# Patient Record
Sex: MALE | Race: WHITE | Employment: FULL TIME | URBAN - METROPOLITAN AREA
[De-identification: names, ages, dates, MRNs, and addresses within clinical notes are randomized per-mention and may not be internally consistent; named-entity substitution may affect disease eponyms.]

---

## 2020-08-12 ENCOUNTER — HOSPITAL ENCOUNTER (EMERGENCY)
Facility: HOSPITAL | Age: 65
Discharge: HOME | End: 2020-08-12
Attending: EMERGENCY MEDICINE

## 2020-08-12 ENCOUNTER — APPOINTMENT (OUTPATIENT)
Dept: GENERAL RADIOLOGY | Facility: HOSPITAL | Age: 65
End: 2020-08-12
Attending: EMERGENCY MEDICINE

## 2020-08-12 ENCOUNTER — APPOINTMENT (OUTPATIENT)
Dept: GENERAL RADIOLOGY | Facility: HOSPITAL | Age: 65
End: 2020-08-12

## 2020-08-12 VITALS
RESPIRATION RATE: 18 BRPM | TEMPERATURE: 98 F | WEIGHT: 180 LBS | SYSTOLIC BLOOD PRESSURE: 170 MMHG | BODY MASS INDEX: 23.86 KG/M2 | OXYGEN SATURATION: 98 % | DIASTOLIC BLOOD PRESSURE: 84 MMHG | HEIGHT: 73 IN | HEART RATE: 60 BPM

## 2020-08-12 DIAGNOSIS — S80.212A KNEE ABRASION, LEFT, INITIAL ENCOUNTER: ICD-10-CM

## 2020-08-12 DIAGNOSIS — S63.259A DISLOCATION OF FINGER, INITIAL ENCOUNTER: ICD-10-CM
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
## 2020-08-12 DIAGNOSIS — S00.81XA ABRASION OF FACE, INITIAL ENCOUNTER: ICD-10-CM

## 2020-08-12 PROCEDURE — 99283 EMERGENCY DEPT VISIT LOW MDM: CPT

## 2020-08-12 PROCEDURE — 90471 IMMUNIZATION ADMIN: CPT

## 2020-08-12 PROCEDURE — 99284 EMERGENCY DEPT VISIT MOD MDM: CPT

## 2020-08-12 PROCEDURE — 96372 THER/PROPH/DIAG INJ SC/IM: CPT

## 2020-08-12 PROCEDURE — 26770 TREAT FINGER DISLOCATION: CPT

## 2020-08-12 PROCEDURE — 73130 X-RAY EXAM OF HAND: CPT | Performed by: EMERGENCY MEDICINE

## 2020-08-12 RX ORDER — TETANUS AND DIPHTHERIA TOXOIDS ADSORBED 2; 2 [LF]/.5ML; [LF]/.5ML
0.5 INJECTION INTRAMUSCULAR ONCE
Status: COMPLETED | OUTPATIENT
Start: 2020-08-12 | End: 2020-08-12

## 2020-08-12 RX ORDER — CEFADROXIL 500 MG/1
500 CAPSULE ORAL 2 TIMES DAILY
Qty: 20 CAPSULE | Refills: 0 | Status: SHIPPED | OUTPATIENT
Start: 2020-08-12 | End: 2020-08-22

## 2020-08-12 RX ORDER — DIAZEPAM 5 MG/ML
5 INJECTION, SOLUTION INTRAMUSCULAR; INTRAVENOUS ONCE
Status: COMPLETED | OUTPATIENT
Start: 2020-08-12 | End: 2020-08-12

## 2020-08-12 RX ORDER — TRAMADOL HYDROCHLORIDE 50 MG/1
TABLET ORAL EVERY 6 HOURS PRN
Qty: 10 TABLET | Refills: 0 | Status: SHIPPED | OUTPATIENT
Start: 2020-08-12 | End: 2020-08-19

## 2020-08-12 RX ORDER — NAPROXEN 500 MG/1
500 TABLET ORAL 2 TIMES DAILY PRN
Qty: 20 TABLET | Refills: 0 | Status: SHIPPED | OUTPATIENT
Start: 2020-08-12 | End: 2021-06-21

## 2020-08-12 RX ORDER — TRAMADOL HYDROCHLORIDE 50 MG/1
50 TABLET ORAL ONCE
Status: DISCONTINUED | OUTPATIENT
Start: 2020-08-12 | End: 2020-08-12

## 2020-08-12 RX ORDER — CEPHALEXIN 500 MG/1
500 CAPSULE ORAL ONCE
Status: COMPLETED | OUTPATIENT
Start: 2020-08-12 | End: 2020-08-12
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
## 2021-06-07 ENCOUNTER — OFFICE VISIT (OUTPATIENT)
Dept: FAMILY MEDICINE CLINIC | Facility: CLINIC | Age: 66
End: 2021-06-07
Payer: COMMERCIAL

## 2021-06-07 VITALS
RESPIRATION RATE: 16 BRPM | BODY MASS INDEX: 24.1 KG/M2 | SYSTOLIC BLOOD PRESSURE: 170 MMHG | OXYGEN SATURATION: 98 % | HEIGHT: 73 IN | HEART RATE: 56 BPM | DIASTOLIC BLOOD PRESSURE: 78 MMHG | WEIGHT: 181.81 LBS

## 2021-06-07 DIAGNOSIS — M54.41 CHRONIC RIGHT-SIDED LOW BACK PAIN WITH RIGHT-SIDED SCIATICA: ICD-10-CM

## 2021-06-07 DIAGNOSIS — K64.9 HEMORRHOIDS, UNSPECIFIED HEMORRHOID TYPE: Primary | ICD-10-CM

## 2021-06-07 DIAGNOSIS — Z12.11 COLON CANCER SCREENING: ICD-10-CM

## 2021-06-07 DIAGNOSIS — I10 HYPERTENSION, UNSPECIFIED TYPE: ICD-10-CM

## 2021-06-07 DIAGNOSIS — G89.29 CHRONIC RIGHT-SIDED LOW BACK PAIN WITH RIGHT-SIDED SCIATICA: ICD-10-CM

## 2021-06-07 PROCEDURE — 99204 OFFICE O/P NEW MOD 45 MIN: CPT | Performed by: FAMILY MEDICINE

## 2021-06-07 PROCEDURE — 3078F DIAST BP <80 MM HG: CPT | Performed by: FAMILY MEDICINE

## 2021-06-07 PROCEDURE — 3077F SYST BP >= 140 MM HG: CPT | Performed by: FAMILY MEDICINE

## 2021-06-07 PROCEDURE — 3008F BODY MASS INDEX DOCD: CPT | Performed by: FAMILY MEDICINE

## 2021-06-07 NOTE — PROGRESS NOTES
Patient presents with:  Establish Care: New Patient   Hemorrhoids: Possible Interal Inflares     HPI:   Brenden Dixon is a 72year old male who presents to the office as a new patient to discuss GI issues.       Hemorrhoids - last few months noticing more he (1.854 m)   Wt 181 lb 12.8 oz (82.5 kg)   SpO2 98%   BMI 23.99 kg/m²     General appearance: alert, appears stated age and cooperative  Eyes: conjunctivae/corneas clear. PERRL, EOM's intact.    and thyroid not enlarged, symmetric, no tenderness/mass/nodules

## 2021-06-17 ENCOUNTER — TELEPHONE (OUTPATIENT)
Dept: FAMILY MEDICINE CLINIC | Facility: CLINIC | Age: 66
End: 2021-06-17

## 2021-06-17 NOTE — TELEPHONE ENCOUNTER
TC from patient's Girlfriend Donta Antony stating they are unable to get in with [facility] until July 1st.  She is very concerned about him, having a pressure feeling in his lower abdomen, a feeling like he constantly has to go to the bathroom

## 2021-06-18 NOTE — TELEPHONE ENCOUNTER
Keep up the  Fiber in diet at this time. Stay plenty well hydrated.    I don't know how much we will be able to move up that appt with the GI team.

## 2021-06-21 PROBLEM — K64.9 HEMORRHOIDS: Status: ACTIVE | Noted: 2021-06-21

## 2021-06-21 PROBLEM — K62.89 RECTAL DISCOMFORT: Status: ACTIVE | Noted: 2021-06-21

## 2021-06-21 PROBLEM — K92.1 HEMATOCHEZIA: Status: ACTIVE | Noted: 2021-06-21

## 2021-06-24 ENCOUNTER — HOSPITAL ENCOUNTER (INPATIENT)
Facility: HOSPITAL | Age: 66
LOS: 2 days | Discharge: HOME | DRG: 375 | End: 2021-06-26
Attending: EMERGENCY MEDICINE | Admitting: HOSPITALIST
Payer: COMMERCIAL

## 2021-06-24 ENCOUNTER — APPOINTMENT (OUTPATIENT)
Dept: CT IMAGING | Facility: HOSPITAL | Age: 66
DRG: 375 | End: 2021-06-24
Attending: EMERGENCY MEDICINE
Payer: COMMERCIAL

## 2021-06-24 DIAGNOSIS — K92.1 HEMATOCHEZIA: ICD-10-CM

## 2021-06-24 DIAGNOSIS — K62.89 RECTAL MASS: Primary | ICD-10-CM

## 2021-06-24 DIAGNOSIS — C20 RECTAL ADENOCARCINOMA (HCC): ICD-10-CM

## 2021-06-24 DIAGNOSIS — K92.2 GASTROINTESTINAL HEMORRHAGE, UNSPECIFIED GASTROINTESTINAL HEMORRHAGE TYPE: ICD-10-CM

## 2021-06-24 PROCEDURE — 74177 CT ABD & PELVIS W/CONTRAST: CPT | Performed by: EMERGENCY MEDICINE

## 2021-06-24 PROCEDURE — 99223 1ST HOSP IP/OBS HIGH 75: CPT | Performed by: HOSPITALIST

## 2021-06-24 RX ORDER — BISACODYL 10 MG
10 SUPPOSITORY, RECTAL RECTAL
Status: DISCONTINUED | OUTPATIENT
Start: 2021-06-24 | End: 2021-06-26

## 2021-06-24 RX ORDER — ONDANSETRON 2 MG/ML
4 INJECTION INTRAMUSCULAR; INTRAVENOUS ONCE
Status: COMPLETED | OUTPATIENT
Start: 2021-06-24 | End: 2021-06-24

## 2021-06-24 RX ORDER — IBUPROFEN 200 MG
400 TABLET ORAL EVERY 6 HOURS PRN
Status: ON HOLD | COMMUNITY
End: 2021-06-26

## 2021-06-24 RX ORDER — SODIUM CHLORIDE 9 MG/ML
INJECTION, SOLUTION INTRAVENOUS CONTINUOUS
Status: DISCONTINUED | OUTPATIENT
Start: 2021-06-24 | End: 2021-06-25

## 2021-06-24 RX ORDER — MORPHINE SULFATE 4 MG/ML
4 INJECTION, SOLUTION INTRAMUSCULAR; INTRAVENOUS EVERY 30 MIN PRN
Status: DISCONTINUED | OUTPATIENT
Start: 2021-06-24 | End: 2021-06-24

## 2021-06-24 RX ORDER — SODIUM CHLORIDE 9 MG/ML
INJECTION, SOLUTION INTRAVENOUS CONTINUOUS
Status: ACTIVE | OUTPATIENT
Start: 2021-06-24 | End: 2021-06-24

## 2021-06-24 RX ORDER — ONDANSETRON 2 MG/ML
4 INJECTION INTRAMUSCULAR; INTRAVENOUS EVERY 4 HOURS PRN
Status: DISCONTINUED | OUTPATIENT
Start: 2021-06-24 | End: 2021-06-24 | Stop reason: SDUPTHER

## 2021-06-24 RX ORDER — ONDANSETRON 2 MG/ML
8 INJECTION INTRAMUSCULAR; INTRAVENOUS EVERY 6 HOURS PRN
Status: DISCONTINUED | OUTPATIENT
Start: 2021-06-24 | End: 2021-06-26

## 2021-06-24 RX ORDER — MORPHINE SULFATE 4 MG/ML
4 INJECTION, SOLUTION INTRAMUSCULAR; INTRAVENOUS EVERY 30 MIN PRN
Status: DISCONTINUED | OUTPATIENT
Start: 2021-06-24 | End: 2021-06-25

## 2021-06-24 RX ORDER — MELATONIN
3 NIGHTLY PRN
Status: DISCONTINUED | OUTPATIENT
Start: 2021-06-24 | End: 2021-06-26

## 2021-06-24 RX ORDER — ACETAMINOPHEN 325 MG/1
650 TABLET ORAL EVERY 6 HOURS PRN
Status: DISCONTINUED | OUTPATIENT
Start: 2021-06-24 | End: 2021-06-25

## 2021-06-24 RX ORDER — ACETAMINOPHEN 500 MG
1000 TABLET ORAL EVERY 6 HOURS PRN
Status: ON HOLD | COMMUNITY
End: 2021-06-26

## 2021-06-24 RX ORDER — IBUPROFEN 400 MG/1
400 TABLET ORAL EVERY 6 HOURS PRN
Status: DISCONTINUED | OUTPATIENT
Start: 2021-06-24 | End: 2021-06-26

## 2021-06-24 RX ORDER — POLYETHYLENE GLYCOL 3350 17 G/17G
17 POWDER, FOR SOLUTION ORAL DAILY PRN
Status: DISCONTINUED | OUTPATIENT
Start: 2021-06-24 | End: 2021-06-25

## 2021-06-24 NOTE — ED PROVIDER NOTES
Patient Seen in: [facility] Emergency Department      History   Patient presents with:  Constipation    Stated Complaint: hemorrhoids, constipation     HPI/Subjective:   HPI    Patient is a 42-year-old male who has had about 3 weeks of constipation, reviewed. All other systems reviewed and negative except as noted above.     Physical Exam     ED Triage Vitals [06/24/21 1412]   BP (!) 167/83   Pulse 61   Resp 18   Temp 97.7 °F (36.5 °C)   Temp src Temporal   SpO2 97 %   O2 Device None (Room air) All other components within normal limits   URINALYSIS WITH CULTURE REFLEX - Abnormal; Notable for the following components:    Ketones Urine 80  (*)     Urobilinogen Urine 4.0 (*)     All other components within normal limits   RAPID SARS-COV-2 BY PCR masses throughout the     liver, the largest measuring 3.6 x 2.9 cm in the right liver on image 27     of series 2. BILIARY:  No visible dilatation or calcification. PANCREAS:  No lesion, fluid collection, ductal dilatation, or atrophy.       SPLEE Lizette Montana of gastroenterology who recommends admission to the hospital service and he will see the patient in the morning. I discussed the patient with the [facility] Mahogany Maciel who will admit the patient.   I reviewed the results with the pa

## 2021-06-24 NOTE — ED INITIAL ASSESSMENT (HPI)
Patient having GI issues the last few months. Patient runs, realized he has to have a bowel movement within 30 minutes. Hx of hemorrhoids. Patient suppose to have colonoscopy on July 7th.  Has been prescribed rectal suppository steroid to help relief pain u

## 2021-06-25 ENCOUNTER — ANESTHESIA EVENT (OUTPATIENT)
Dept: ENDOSCOPY | Facility: HOSPITAL | Age: 66
DRG: 375 | End: 2021-06-25
Payer: COMMERCIAL

## 2021-06-25 ENCOUNTER — HOSPITAL ENCOUNTER (INPATIENT)
Dept: RADIATION ONCOLOGY | Facility: HOSPITAL | Age: 66
Discharge: HOME | DRG: 375 | End: 2021-06-25
Attending: EMERGENCY MEDICINE
Payer: COMMERCIAL

## 2021-06-25 DIAGNOSIS — K62.89 RECTAL MASS: Primary | ICD-10-CM

## 2021-06-25 PROCEDURE — 99232 SBSQ HOSP IP/OBS MODERATE 35: CPT | Performed by: HOSPITALIST

## 2021-06-25 PROCEDURE — 99223 1ST HOSP IP/OBS HIGH 75: CPT | Performed by: SPECIALIST

## 2021-06-25 RX ORDER — MORPHINE SULFATE 4 MG/ML
4 INJECTION, SOLUTION INTRAMUSCULAR; INTRAVENOUS EVERY 2 HOUR PRN
Status: DISCONTINUED | OUTPATIENT
Start: 2021-06-25 | End: 2021-06-26

## 2021-06-25 RX ORDER — ACETAMINOPHEN 500 MG
1000 TABLET ORAL EVERY 6 HOURS PRN
Status: DISCONTINUED | OUTPATIENT
Start: 2021-06-25 | End: 2021-06-26

## 2021-06-25 RX ORDER — MAGNESIUM CARB/ALUMINUM HYDROX 105-160MG
296 TABLET,CHEWABLE ORAL ONCE
Status: COMPLETED | OUTPATIENT
Start: 2021-06-25 | End: 2021-06-25

## 2021-06-25 RX ORDER — MAGNESIUM CARB/ALUMINUM HYDROX 105-160MG
296 TABLET,CHEWABLE ORAL ONCE
Status: DISCONTINUED | OUTPATIENT
Start: 2021-06-25 | End: 2021-06-25

## 2021-06-25 RX ORDER — POLYETHYLENE GLYCOL 3350 17 G/17G
17 POWDER, FOR SOLUTION ORAL DAILY
Status: DISCONTINUED | OUTPATIENT
Start: 2021-06-25 | End: 2021-06-26

## 2021-06-25 RX ORDER — MORPHINE SULFATE 2 MG/ML
2 INJECTION, SOLUTION INTRAMUSCULAR; INTRAVENOUS EVERY 2 HOUR PRN
Status: DISCONTINUED | OUTPATIENT
Start: 2021-06-25 | End: 2021-06-26

## 2021-06-25 RX ORDER — HYDROCODONE BITARTRATE AND ACETAMINOPHEN 5; 325 MG/1; MG/1
1 TABLET ORAL EVERY 4 HOURS PRN
Status: DISCONTINUED | OUTPATIENT
Start: 2021-06-25 | End: 2021-06-26

## 2021-06-25 NOTE — H&P
[facility]  History and Physical     Fransisco Aida Patient Status:  Emergency    10/29/1955 MRN [id number]   Location [address] [facility] Attending Aliyah Fontana MD   Hosp Day # 0 PCP Debi Pike MD     Chief Compla point review of systems was completed. Pertinent positives and negatives noted in the HPI.     Physical Exam:    /86   Pulse 54   Temp 97.7 °F (36.5 °C) (Temporal)   Resp 12   Ht 6' 1\" (1.854 m)   Wt 178 lb (80.7 kg)   SpO2 98%   BMI 23.48 kg/m² 1. Rectal mass with suspected liver mets; GI to see; will probably need endoscopic guided mass biopsy; oncology consult as well; NPO at midnight; fluids  Will do med rec once review complete  Quality:  · DVT Prophylaxis: scds only for now  · CODE statu

## 2021-06-25 NOTE — PROGRESS NOTES
[facility]  Progress Note     Calvin Calhoun Patient Status:  Inpatient    10/29/1955 MRN [id number]   [facility] 4NW-A Attending Marilia Soria MD   [facility] Day # 1 PCP Amberly Ndiaye MD     Chief Complaint: rectal mass    S: Pa citrate  296 mL Oral Once       ASSESSMENT / PLAN:     1. Rectal Mass  1. Flex sig with BX  2. Liver Lesions  1. Concerning for metastatic disease  3. Neoplasm related pain  1. Pain meds adjusted  4. Constipation  1. Due to above  2. Bowel regimen  3.  Mg c

## 2021-06-25 NOTE — CONSULTS
[facility]                       Gastroenterology Consultation-[facility]    Gehans Perdomo Patient Status:  Inpatient    10/29/1955 MRN [id number]   [facility] 4NW-A Attending Pepper Mcknight MD   [address] Day # 1 PCP Tiara Mera sulfate (PF) 4 MG/ML injection 4 mg, 4 mg, Intravenous, Q30 Min PRN  [COMPLETED] ondansetron HCl (ZOFRAN) injection 4 mg, 4 mg, Intravenous, Once  [] 0.9% NaCl infusion, , Intravenous, Continuous  0.9% NaCl infusion, , Intravenous, Continuous  aceta ideation, or homicidal ideation           Oncologic: The patient reports no history of prior solid tumor or hematologic malignancy           ENT: The patient reports no hoarseness of voice, hearing loss, sinus congestion, tinnitus           Neurologic:  The WBC 7.1   .0       Recent Labs   Lab 06/24/21  1453   ALT 28   AST 26       Imaging:   PROCEDURE:  CT ABDOMEN PELVIS IV CONTRAST, NO ORAL (ER)       COMPARISON:  None.        INDICATIONS:  GI bleeding, hemorrhoids, constipation       TECHNIQUE:  CT hydrocele. BONES:  Degenerative changes of spine. No osteolytic or osteoblastic lesion. LUNG BASES:  No visible pulmonary or pleural disease. Impression   CONCLUSION:     1. Large rectal mass with perirectal lymphadenopathy.    2. Multiple hep agreeable.      Venkat Pastrana DO  6:15 PM  6/25/2021  [facility]  [phone]

## 2021-06-25 NOTE — PAYOR COMM NOTE
--------------  ADMISSION REVIEW     Payor: MAURILIO ROBERTO  Subscriber #:  [id number]  Authorization Number:  [id number]    Admit date: 6/24/21  Admit time:  9:08 PM       Admitting Physician: Earle Broderick MD  Attending Physician:  Lm Kelley MD  [facility] pain/belching    • Hemorrhoids    • Irregular bowel habits               Past Surgical History:   Procedure Laterality Date   • BACK SURGERY  1989    Lumbar                Social History    Tobacco Use      Smoking status: Never Smoker      Smokeless tobac tissue density at 12:00 within his rectum, and is nontender. Hemoccult positive with appropriate controls. Prostate feels normal and is nontender. Extremities: No deformity, nontender throughout.   Distal pulses normal and symmetric  Skin: No masses or n coronal MPR imaging was performed. Dose reduction techniques     were used. Dose information is     transmitted to the ACR [employer] of Radiology) NRDR ([address]) which includes the Dose Index Registry.          PATIENT prostate. 4. Moderate stool throughout the colon is suggestive of constipation. The     rectal mass does not appear to be obstructing. 5. Left hydrocele.               Dictated by (CST): Bib Schulte MD on 6/24/2021 at 6:49 PM         Finalized by ( Cody Douglass Patient Status:  Emergency    10/29/1955 MRN [id number]   Location [address] [facility] Attending Ally Fontana MD   Hosp Day # 0 PCP Barrington Monet MD     Chief Complaint: back pain    History of Present Illnes Pulse 54   Temp 97.7 °F (36.5 °C) (Temporal)   Resp 12   Ht 6' 1\" (1.854 m)   Wt 178 lb (80.7 kg)   SpO2 98%   BMI 23.48 kg/m²   General: No acute distress. Alert and oriented x 3. HEENT: Normocephalic atraumatic. Moist mucous membranes. EOM-I. PERRLA.  A midnight; fluids  Will do med rec once review complete  Quality:  · DVT Prophylaxis: scds only for now  · CODE status: full  · Mcconnell: no  · If COVID testing is negative, may discontinue isolation: yes     Plan of care discussed with patient and dr. rahman

## 2021-06-25 NOTE — ANESTHESIA PREPROCEDURE EVALUATION
PRE-OP EVALUATION    Patient Name: Nabeel Parra    Admit Diagnosis: Rectal mass [K62.89]  Gastrointestinal hemorrhage, unspecified gastrointestinal hemorrhage type [K92.2]    Pre-op Diagnosis: Rectal mass [K62.89]  Hematochezia [K92.1]    FLEXIBLE SIG Evaluation    Patient summary reviewed. Anesthetic Complications  (-) history of anesthetic complications         GI/Hepatic/Renal                (+) liver disease (Liver mets)                 Cardiovascular    Negative cardiovascular ROS.     Exercise t anesthetic plan.   Plan/risks discussed with: patient and child/children                Present on Admission:  **None**

## 2021-06-25 NOTE — PLAN OF CARE
Admitted from home with rectal pain, pressure, constipation. Had been following with GI as outpatient for these problems. Pain brought pt to er and ct scan showed rectal mass with liver mets. Alert and orientated x4. NPO for possible procedures todaoy.

## 2021-06-25 NOTE — CONSULTS
THE [facility]ematology Oncology Group Report of Consultation      Patient Name: Melissa Wong   YOB: 1955  Medical Record Number: [id number]  Consulting Physician: Taylor Wellington. Michele Campos M.D.    Referring Physician: James Leon MD    [facility] 10 mg, 10 mg, Rectal, Daily PRN  ondansetron HCl (ZOFRAN) injection 8 mg, 8 mg, Intravenous, Q6H PRN  ibuprofen (MOTRIN) tab 400 mg, 400 mg, Oral, Q6H PRN      Allergies   Mr. Lazaro Frye has No Known Allergies.      Review of Systems   Constitutional No fevers speech; verbalizes understanding of our discussions today.     Laboratory   Recent Results (from the past 24 hour(s))   Comp Metabolic Panel (14)    Collection Time: 06/24/21  2:53 PM   Result Value Ref Range    Glucose 96 70 - 99 mg/dL    Sodium 138 136 - x10(3) uL    Monocyte Absolute 0.87 0.10 - 1.00 x10(3) uL    Eosinophil Absolute 0.07 0.00 - 0.70 x10(3) uL    Basophil Absolute 0.05 0.00 - 0.20 x10(3) uL    Immature Granulocyte Absolute 0.03 0.00 - 1.00 x10(3) uL    Neutrophil % 77.8 %    Lymphocyte % 1 x10(6)uL    HGB 14.0 13.0 - 17.5 g/dL    HCT 40.5 39.0 - 53.0 %    .0 150.0 - 450.0 10(3)uL    MCV 91.4 80.0 - 100.0 fL    MCH 31.6 26.0 - 34.0 pg    MCHC 34.6 31.0 - 37.0 g/dL    RDW 11.9 11.0 - 15.0 %    RDW-SD 40.1 35.1 - 46.3 fL    Neutrophil Ab  Mild atherosclerosis.  No aneurysm. RETROPERITONEUM:  No mass or adenopathy.     BOWEL/MESENTERY:  Lobular mass involves the right rectum and appears to extend into the perirectal fat, measuring 4.3 cm transverse and 3.9 cm AP on image 105 of series 2. Surgical relief of the partial obstruction is less desirable given its low location and the likely need for colostomy.  If medical management by GI for relief of his symptoms is expected to be unsuccessful, short course radiation therapy will be effective a

## 2021-06-26 ENCOUNTER — ANESTHESIA (OUTPATIENT)
Dept: ENDOSCOPY | Facility: HOSPITAL | Age: 66
DRG: 375 | End: 2021-06-26
Payer: COMMERCIAL

## 2021-06-26 VITALS
HEIGHT: 73 IN | SYSTOLIC BLOOD PRESSURE: 152 MMHG | RESPIRATION RATE: 17 BRPM | DIASTOLIC BLOOD PRESSURE: 76 MMHG | TEMPERATURE: 99 F | WEIGHT: 178 LBS | HEART RATE: 61 BPM | BODY MASS INDEX: 23.59 KG/M2 | OXYGEN SATURATION: 97 %

## 2021-06-26 PROCEDURE — 99232 SBSQ HOSP IP/OBS MODERATE 35: CPT | Performed by: SPECIALIST

## 2021-06-26 PROCEDURE — 0DBP8ZX EXCISION OF RECTUM, VIA NATURAL OR ARTIFICIAL OPENING ENDOSCOPIC, DIAGNOSTIC: ICD-10-PCS | Performed by: INTERNAL MEDICINE

## 2021-06-26 PROCEDURE — 99239 HOSP IP/OBS DSCHRG MGMT >30: CPT | Performed by: HOSPITALIST

## 2021-06-26 RX ORDER — SENNA AND DOCUSATE SODIUM 50; 8.6 MG/1; MG/1
2 TABLET, FILM COATED ORAL NIGHTLY
Qty: 60 TABLET | Refills: 0 | Status: SHIPPED | OUTPATIENT
Start: 2021-06-26 | End: 2021-08-10

## 2021-06-26 RX ORDER — DOCUSATE SODIUM 100 MG/1
100 CAPSULE, LIQUID FILLED ORAL 2 TIMES DAILY
Qty: 60 CAPSULE | Refills: 0 | Status: SHIPPED | OUTPATIENT
Start: 2021-06-26 | End: 2021-06-26

## 2021-06-26 RX ORDER — NALOXONE HYDROCHLORIDE 0.4 MG/ML
80 INJECTION, SOLUTION INTRAMUSCULAR; INTRAVENOUS; SUBCUTANEOUS AS NEEDED
Status: DISCONTINUED | OUTPATIENT
Start: 2021-06-26 | End: 2021-06-26 | Stop reason: HOSPADM

## 2021-06-26 RX ORDER — SODIUM CHLORIDE, SODIUM LACTATE, POTASSIUM CHLORIDE, CALCIUM CHLORIDE 600; 310; 30; 20 MG/100ML; MG/100ML; MG/100ML; MG/100ML
INJECTION, SOLUTION INTRAVENOUS CONTINUOUS
Status: DISCONTINUED | OUTPATIENT
Start: 2021-06-26 | End: 2021-06-26

## 2021-06-26 RX ORDER — POLYETHYLENE GLYCOL 3350 17 G/17G
POWDER, FOR SOLUTION ORAL
Refills: 0 | Status: SHIPPED | COMMUNITY
Start: 2021-06-26

## 2021-06-26 RX ORDER — HYDROCODONE BITARTRATE AND ACETAMINOPHEN 5; 325 MG/1; MG/1
1 TABLET ORAL EVERY 4 HOURS PRN
Qty: 20 TABLET | Refills: 0 | Status: SHIPPED | OUTPATIENT
Start: 2021-06-26 | End: 2021-07-07

## 2021-06-26 RX ORDER — POLYETHYLENE GLYCOL 3350 17 G/17G
17 POWDER, FOR SOLUTION ORAL 2 TIMES DAILY
Status: DISCONTINUED | OUTPATIENT
Start: 2021-06-26 | End: 2021-06-26

## 2021-06-26 RX ORDER — SODIUM CHLORIDE 9 MG/ML
INJECTION, SOLUTION INTRAVENOUS CONTINUOUS PRN
Status: DISCONTINUED | OUTPATIENT
Start: 2021-06-26 | End: 2021-06-26 | Stop reason: SURG

## 2021-06-26 RX ADMIN — SODIUM CHLORIDE: 9 INJECTION, SOLUTION INTRAVENOUS at 09:15:00

## 2021-06-26 NOTE — PLAN OF CARE
Problem: GASTROINTESTINAL - ADULT  Goal: Minimal or absence of nausea and vomiting  Description: INTERVENTIONS:  - Maintain adequate hydration with IV or PO as ordered and tolerated  - Nasogastric tube to low intermittent suction as ordered  - Evaluate e Monitor response to electrolyte replacements, including rhythm and repeat lab results as appropriate  - Fluid restriction as ordered  - Instruct patient on fluid and nutrition restrictions as appropriate  Outcome: Progressing  Goal: Hemodynamic stability a

## 2021-06-26 NOTE — PROGRESS NOTES
THE [facility]ology Oncology [facility] Progress Note      Patient Name: Rosalino Champion   YOB: 1955  Medical Record Number: [id number]      Subjective  Patient used magnesium citrate with resolution of his constipation and without excessive pain.  Sc intact. Psychiatric Mood and affect appropriate; coherent speech; verbalizes understanding of our discussions today.     Laboratory   Recent Results (from the past 24 hour(s))   Basic Metabolic Panel (8)    Collection Time: 06/26/21  5:01 AM   Result Value

## 2021-06-26 NOTE — PLAN OF CARE
Pt alert and oriented x4. VSS and afebrile. Pt with complaints of back pain on a scale of 8/10. Pt given PRN motrin per request as Pt states that the motrin helps as much as any other stronger pain medication.   Pt also given magnesium citrate and after

## 2021-06-26 NOTE — OPERATIVE REPORT
Operative Report-Colonoscopy    PREOPERATIVE DIAGNOSIS/INDICATION:  1. Abnormal CT  2. Hematochezia  POSTOPERTATIVE DIAGNOSIS:  1. Rectal mass-8 cm x 10 cm-involving anal verge  2.  Poor colon prep  PROCEDURE drug reactions   - Follow biopsies.  - Miralax bid  - Advance diet as tolerated  - OK for dc from GI standpoint when cleared by oncology with close outpatient follow up.   CC Report:   Boni Sahu  6/26/2021  9:34 AM

## 2021-06-26 NOTE — PROGRESS NOTES
Patient seen and examined. Medically clear to discharge today after c-scope. Had BM's with mg citrate.       Lynn Hayes MD

## 2021-06-26 NOTE — ANESTHESIA POSTPROCEDURE EVALUATION
[address] Patient Status:  Inpatient   Age/Gender 72year old male MRN [id number]   Location [id number] [place] [address] Attending Lenora Corrales, [address] [place] [address] Se Day # 2 PCP Luisana Bailey MD       Anesthesia Post-op Note

## 2021-06-26 NOTE — CONSULTS
[facility] Oncology New Consultation      Patient Name: Jaycee Mariano   MRN: [id number]  PCP: Michiel Ormond, MD  Referring Physician: Henrietta Espana MD    Diagnosis: rectal mass suspicious for primary rectal neoplasm, metastatic inflammatory bowel disease. Physical Exam  There were no vitals filed for this visit.   Temp:  [98.2 °F (36.8 °C)-98.6 °F (37 °C)] 98.3 °F (36.8 °C)  Pulse:  [52-59] 55  Resp:  [18] 18  BP: (125-177)/(69-91) 149/77    ECO    Gen: NAD  HEENT: NCAT  L

## 2021-06-27 NOTE — DISCHARGE SUMMARY
[facility]OSPITAL[facility]  DISCHARGE SUMMARY     Tadeo Mercado Patient Status:  Inpatient    10/29/1955 MRN [id number]   [facility] 4NW-A Attending No att. providers found   Hosp Day # 2 PCP Francisco Charles MD     Date of Admission: 2021 diarrhea develops   Quantity: 60 tablet  Refills: 0        CONTINUE taking these medications      Instructions Prescription details   magnesium hydroxide 400 MG/5ML Susp  Commonly known as: MILK OF MAGNESIA      Use over the counter Milk of Magnesia.  Take address is [address], [address] [place] [address]. To reach Outpatient Registration, park in the [address] and enter the double doors located on the ground floor. Proceed to the lobby past the Information Desk to Outpatient Registration.

## 2021-06-28 ENCOUNTER — PATIENT OUTREACH (OUTPATIENT)
Dept: CASE MANAGEMENT | Age: 66
End: 2021-06-28

## 2021-06-28 ENCOUNTER — TELEPHONE (OUTPATIENT)
Dept: HEMATOLOGY/ONCOLOGY | Facility: HOSPITAL | Age: 66
End: 2021-06-28

## 2021-06-28 ENCOUNTER — TELEPHONE (OUTPATIENT)
Dept: FAMILY MEDICINE CLINIC | Facility: CLINIC | Age: 66
End: 2021-06-28

## 2021-06-28 DIAGNOSIS — K62.89 RECTAL MASS: ICD-10-CM

## 2021-06-28 DIAGNOSIS — Z02.9 ENCOUNTERS FOR UNSPECIFIED ADMINISTRATIVE PURPOSE: ICD-10-CM

## 2021-06-28 PROCEDURE — 1111F DSCHRG MED/CURRENT MED MERGE: CPT

## 2021-06-28 NOTE — PAYOR COMM NOTE
--------------  DISCHARGE REVIEW    Payor: MAURILIO ROBERTO  Subscriber #:  [id number]  Authorization Number:  [id number]    Admit date: 6/24/21  Admit time:   9:08 PM  Discharge Date: 6/26/2021  2:59 PM     Admitting Physician: Nils Murillo MD  Primary Car mass    Lab/Test results pending at Discharge:   · Pathology    Consultants:  • Gastroenterology  • Oncology    Discharge Medication List:  START taking these medications      Instructions Prescription details   HYDROcodone-acetaminophen 5-325 MG Tabs  [address]  [address].  [phone]    Schedule an appointment as soon as possible for a visit    Appointments for Next 30 Days 6/27/2021 - 7/27/2021      Date Arrival Time Visit Type Length Department Provider     7/4/2021 12:30 PM  Michelle Escudero [

## 2021-06-28 NOTE — PROGRESS NOTES
Initial Post Discharge Follow Up   Discharge Date: 6/26/21  Contact Date: 6/28/2021    Consent Verification:  Assessment Completed With: Patient  HIPAA Verified?   Yes    Discharge Dx:  Rectal Mass  Liver lesions    Was TCC ordered: no    General:   • Ho excessive diarrhea, decrease amount by 1/2.  0   • magnesium hydroxide 400 MG/5ML Oral Suspension Use over the counter Milk of Magnesia.  Take as directed on the bottle if you do not have a bowel movement the day before.  0   • Senna-Docusate Sodium 8.6-50 Colonoscopy with Zenobia Loco MD [facility] ([facility])    Please arrive 60 minutes prior to your scheduled procedure time. [address] [place] [address]  [facility] [address]

## 2021-06-28 NOTE — TELEPHONE ENCOUNTER
MD Rachell Neumann, RN  Caller: Unspecified (Today,  9:45 AM)  Can come in today to see me at noon but can reassure him that cancer doesn't grow that fast. He may be dehydrated which can cause lightheadedness.  Drinking fluids will basia

## 2021-06-28 NOTE — TELEPHONE ENCOUNTER
Pt discharged 6-26-21, rectal mass. Pt does not have HFU appt scheduled at this time.  NCM offered to schedule HFU with PCP but pt declined, prefers to see Dr. Blanco Melton first to discuss next steps in treatment plan (pt spoke with Dr. Blanco Melton on the phone to

## 2021-06-28 NOTE — TELEPHONE ENCOUNTER
BODØ is calling to get her father's Biopsy Results and she need to know his next steps and pain management. Please call BODØ at   [phone].

## 2021-06-28 NOTE — TELEPHONE ENCOUNTER
Khari-damien called, Cresencio in ER/Hospital and newly dx with rectal mass, liver lesions. He is having increasing pain and maybe some dizziness. They are concerned that things are progressing rapidly. Instructed to present to ER if symptoms worsen.   They ar

## 2021-06-29 ENCOUNTER — TELEPHONE (OUTPATIENT)
Dept: HEMATOLOGY/ONCOLOGY | Facility: HOSPITAL | Age: 66
End: 2021-06-29

## 2021-06-29 NOTE — TELEPHONE ENCOUNTER
Spoke with imaging center has appointment for CT of the Chest tomorrow.  Patient is also scheduled at [facility] on 7/2 for the same exam.  CT of the Chest.      Telephone call to patient - stated he is having CT of the chest at [facility] on 7/2 - wants t

## 2021-06-29 NOTE — TELEPHONE ENCOUNTER
Elenita Díaz calling to get an order faxed over to [phone] and to see if there any labs, please call.

## 2021-06-29 NOTE — TELEPHONE ENCOUNTER
Called patient and he is waiting to see his oncologist first, he is supposed to be hearing from them today to be scheduled.   Once he schedules with them he will find out when he needs to follow up with Dr. Kay Marks and call us back to schedule

## 2021-07-02 ENCOUNTER — HOSPITAL ENCOUNTER (OUTPATIENT)
Dept: CT IMAGING | Facility: HOSPITAL | Age: 66
Discharge: HOME | End: 2021-07-02
Attending: SPECIALIST
Payer: COMMERCIAL

## 2021-07-02 ENCOUNTER — TELEPHONE (OUTPATIENT)
Dept: HEMATOLOGY/ONCOLOGY | Facility: HOSPITAL | Age: 66
End: 2021-07-02

## 2021-07-02 DIAGNOSIS — C20 RECTAL CANCER METASTASIZED TO LIVER (HCC): ICD-10-CM

## 2021-07-02 DIAGNOSIS — C78.7 RECTAL CANCER METASTASIZED TO LIVER (HCC): ICD-10-CM

## 2021-07-02 PROCEDURE — 71260 CT THORAX DX C+: CPT | Performed by: SPECIALIST

## 2021-07-02 NOTE — TELEPHONE ENCOUNTER
Regarding: FW: Chest CT results  You can call the daughter. Tell her that I sent a message to her father with results. If her father has questions, I am happy to speak with him.  I don't really want to speak with her.      ----- Message -----  From: Gil Paul

## 2021-07-06 RX ORDER — HYDROCODONE BITARTRATE AND ACETAMINOPHEN 5; 325 MG/1; MG/1
1-2 TABLET ORAL EVERY 6 HOURS PRN
Qty: 30 TABLET | Refills: 0 | Status: SHIPPED | OUTPATIENT
Start: 2021-07-06 | End: 2021-07-23

## 2021-07-07 PROBLEM — C78.7 RECTAL CANCER METASTASIZED TO LIVER (HCC): Status: ACTIVE | Noted: 2021-07-07

## 2021-07-07 PROBLEM — C20 RECTAL CANCER METASTASIZED TO LIVER (HCC): Status: ACTIVE | Noted: 2021-07-07

## 2021-07-07 PROBLEM — C19 COLORECTAL CANCER, STAGE IV (HCC): Status: ACTIVE | Noted: 2021-07-07

## 2021-07-08 LAB
MISMATCH REPAIR BY IHC RESULT: NORMAL
MISMATCH REPAIR BY IHC W/MLH1: NORMAL
MISMATCH REPAIR BY IHC W/MSH2: NORMAL
MISMATCH REPAIR BY IHC W/MSH6: NORMAL
MISMATCH REPAIR BY IHC W/PMS2: NORMAL

## 2021-07-09 ENCOUNTER — OFFICE VISIT (OUTPATIENT)
Dept: HEMATOLOGY/ONCOLOGY | Facility: HOSPITAL | Age: 66
End: 2021-07-09
Attending: SPECIALIST
Payer: COMMERCIAL

## 2021-07-09 VITALS
BODY MASS INDEX: 23 KG/M2 | SYSTOLIC BLOOD PRESSURE: 164 MMHG | OXYGEN SATURATION: 97 % | TEMPERATURE: 97 F | WEIGHT: 172.5 LBS | HEART RATE: 59 BPM | DIASTOLIC BLOOD PRESSURE: 88 MMHG | RESPIRATION RATE: 16 BRPM

## 2021-07-09 DIAGNOSIS — C20 RECTAL CANCER METASTASIZED TO LIVER (HCC): ICD-10-CM

## 2021-07-09 DIAGNOSIS — C78.7 METASTASES TO THE LIVER (HCC): ICD-10-CM

## 2021-07-09 DIAGNOSIS — C78.7 RECTAL CANCER METASTASIZED TO LIVER (HCC): ICD-10-CM

## 2021-07-09 DIAGNOSIS — K62.89 RECTAL PAIN: ICD-10-CM

## 2021-07-09 DIAGNOSIS — K59.02 CONSTIPATION DUE TO OUTLET DYSFUNCTION: ICD-10-CM

## 2021-07-09 DIAGNOSIS — C19 COLORECTAL CANCER, STAGE IV (HCC): Primary | ICD-10-CM

## 2021-07-09 PROCEDURE — 99215 OFFICE O/P EST HI 40 MIN: CPT | Performed by: SPECIALIST

## 2021-07-09 NOTE — PROGRESS NOTES
THE [facility] Hematology Oncology Group Progress Note      Patient Name: Alexis Benton   YOB: 1955  Medical Record Number: [id number]  Attending Physician: Aydin Llamas. Rhona Gaona M.D.      Date of Visit: 7/9/2021       Chief Complaint  Metastatic adenoca arm, Patient Position: Sitting, Cuff Size: adult)   Pulse 59   Temp 97 °F (36.1 °C) (Tympanic)   Resp 16   Wt 78.2 kg (172 lb 8 oz)   SpO2 97%   BMI 22.76 kg/m²     Physical Examination   Constitutional Well developed and well nourished; in no apparent dis wishes to pursue treatment with us. 2.  Rectal pain: Norco and ibuprofen PRN. 3.  Constipation: MOM prn. Planned Follow Up  Patient will return in 1 week if he decides to pursue treatment with us.      Encounter Time  Pre-charting/reviewing medic

## 2021-07-12 ENCOUNTER — DOCUMENTATION ONLY (OUTPATIENT)
Dept: HEMATOLOGY/ONCOLOGY | Facility: HOSPITAL | Age: 66
End: 2021-07-12

## 2021-07-12 LAB — NRAS MUTATION DETECTION, PYROSEQ.: NOT DETECTED

## 2021-07-12 NOTE — PROGRESS NOTES
Nutrition screen complete as triggered by Best Practice dx of  Metastatic rectal cancer to liver. Chart reviewed. Noted pt to begin chemotherapy. RD will attempt to meet w/ pt during chemo infusion.

## 2021-07-13 ENCOUNTER — HOSPITAL ENCOUNTER (OUTPATIENT)
Dept: INTERVENTIONAL RADIOLOGY/VASCULAR | Facility: HOSPITAL | Age: 66
Discharge: HOME | End: 2021-07-13
Attending: SPECIALIST

## 2021-07-14 ENCOUNTER — HOSPITAL ENCOUNTER (OUTPATIENT)
Dept: MRI IMAGING | Age: 66
Discharge: HOME | End: 2021-07-14
Attending: SPECIALIST
Payer: COMMERCIAL

## 2021-07-14 DIAGNOSIS — C20 RECTAL CANCER METASTASIZED TO LIVER (HCC): ICD-10-CM

## 2021-07-14 DIAGNOSIS — C78.7 RECTAL CANCER METASTASIZED TO LIVER (HCC): ICD-10-CM

## 2021-07-14 PROCEDURE — A9581 GADOXETATE DISODIUM INJ: HCPCS | Performed by: SPECIALIST

## 2021-07-14 PROCEDURE — 74183 MRI ABD W/O CNTR FLWD CNTR: CPT | Performed by: SPECIALIST

## 2021-07-15 ENCOUNTER — HOSPITAL ENCOUNTER (OUTPATIENT)
Dept: INTERVENTIONAL RADIOLOGY/VASCULAR | Facility: HOSPITAL | Age: 66
Discharge: HOME | End: 2021-07-15
Attending: SPECIALIST | Admitting: SPECIALIST
Payer: COMMERCIAL

## 2021-07-15 VITALS
OXYGEN SATURATION: 97 % | DIASTOLIC BLOOD PRESSURE: 74 MMHG | RESPIRATION RATE: 12 BRPM | HEART RATE: 58 BPM | SYSTOLIC BLOOD PRESSURE: 123 MMHG

## 2021-07-15 DIAGNOSIS — C78.7 METASTASES TO THE LIVER (HCC): ICD-10-CM

## 2021-07-15 DIAGNOSIS — C19 COLORECTAL CANCER, STAGE IV (HCC): ICD-10-CM

## 2021-07-15 LAB
INR CARTRIDGE LOT #: NORMAL
INR: 1.1 (ref 0.8–1.3)

## 2021-07-15 PROCEDURE — 0JH60WZ INSERTION OF TOTALLY IMPLANTABLE VASCULAR ACCESS DEVICE INTO CHEST SUBCUTANEOUS TISSUE AND FASCIA, OPEN APPROACH: ICD-10-PCS | Performed by: RADIOLOGY

## 2021-07-15 PROCEDURE — 02HV33Z INSERTION OF INFUSION DEVICE INTO SUPERIOR VENA CAVA, PERCUTANEOUS APPROACH: ICD-10-PCS | Performed by: RADIOLOGY

## 2021-07-15 PROCEDURE — 99152 MOD SED SAME PHYS/QHP 5/>YRS: CPT

## 2021-07-15 PROCEDURE — 77001 FLUOROGUIDE FOR VEIN DEVICE: CPT

## 2021-07-15 PROCEDURE — 36561 INSERT TUNNELED CV CATH: CPT

## 2021-07-15 PROCEDURE — 76937 US GUIDE VASCULAR ACCESS: CPT

## 2021-07-15 RX ORDER — CHLORHEXIDINE GLUCONATE 4 G/100ML
30 SOLUTION TOPICAL ONCE
Status: COMPLETED | OUTPATIENT
Start: 2021-07-15 | End: 2021-07-15

## 2021-07-15 RX ORDER — HEPARIN SODIUM 5000 [USP'U]/ML
INJECTION, SOLUTION INTRAVENOUS; SUBCUTANEOUS
Status: COMPLETED
Start: 2021-07-15 | End: 2021-07-15

## 2021-07-15 RX ORDER — CEFAZOLIN SODIUM 1 G/3ML
INJECTION, POWDER, FOR SOLUTION INTRAMUSCULAR; INTRAVENOUS
Status: COMPLETED
Start: 2021-07-15 | End: 2021-07-15

## 2021-07-15 RX ORDER — LIDOCAINE HYDROCHLORIDE AND EPINEPHRINE 10; 10 MG/ML; UG/ML
INJECTION, SOLUTION INFILTRATION; PERINEURAL
Status: COMPLETED
Start: 2021-07-15 | End: 2021-07-15

## 2021-07-15 RX ORDER — MIDAZOLAM HYDROCHLORIDE 1 MG/ML
INJECTION INTRAMUSCULAR; INTRAVENOUS
Status: COMPLETED
Start: 2021-07-15 | End: 2021-07-15

## 2021-07-15 RX ORDER — LIDOCAINE HYDROCHLORIDE 10 MG/ML
INJECTION, SOLUTION INFILTRATION; PERINEURAL
Status: COMPLETED
Start: 2021-07-15 | End: 2021-07-15

## 2021-07-15 RX ORDER — SODIUM CHLORIDE 9 MG/ML
INJECTION, SOLUTION INTRAVENOUS CONTINUOUS
Status: DISCONTINUED | OUTPATIENT
Start: 2021-07-15 | End: 2021-07-21

## 2021-07-15 RX ADMIN — CHLORHEXIDINE GLUCONATE 30 ML: 4 SOLUTION TOPICAL at 11:00:00

## 2021-07-15 NOTE — PROGRESS NOTES
Patient returned post port a cath implant. Dressing to right neck soft, no bleeding, no hematoma. Mepilex dressing to right chest soft, dry and intact. Pt denies any pain at this time. Eating and drinking.  All questions concerns addressed with patient and

## 2021-07-15 NOTE — H&P
[facility]   History & Physical    Tadeo Mercado Patient Status:  Outpatient in a Bed    10/29/1955 MRN [id number]   Location [address] [facility] Attending Kati Antony MD   Hosp Day # 0 PCP Francisco Charles MD     Ad patient and/or legal representative the potential benefits, risks, and side effects of this procedure, the likelihood of the patient achieving goals; and the potential problems that might occur during recuperation.   I discussed reasonable alternatives to t

## 2021-07-15 NOTE — PROCEDURES
[facility]  Procedure Note    Anshul Choudhurysin Patient Status:  Outpatient in a Bed    10/29/1955 MRN [id number]   Location [address] [facility] Attending Nica Arreguin MD   Hosp Day # 0 PCP Farshad Bates MD     Procedu

## 2021-07-16 ENCOUNTER — OFFICE VISIT (OUTPATIENT)
Dept: HEMATOLOGY/ONCOLOGY | Facility: HOSPITAL | Age: 66
End: 2021-07-16
Attending: SPECIALIST
Payer: COMMERCIAL

## 2021-07-16 ENCOUNTER — SOCIAL WORK SERVICES (OUTPATIENT)
Dept: HEMATOLOGY/ONCOLOGY | Facility: HOSPITAL | Age: 66
End: 2021-07-16

## 2021-07-16 VITALS
TEMPERATURE: 98 F | HEIGHT: 73.27 IN | RESPIRATION RATE: 16 BRPM | WEIGHT: 171.63 LBS | DIASTOLIC BLOOD PRESSURE: 89 MMHG | HEART RATE: 56 BPM | BODY MASS INDEX: 22.5 KG/M2 | OXYGEN SATURATION: 98 % | SYSTOLIC BLOOD PRESSURE: 152 MMHG

## 2021-07-16 DIAGNOSIS — C78.7 RECTAL CANCER METASTASIZED TO LIVER (HCC): ICD-10-CM

## 2021-07-16 DIAGNOSIS — Z71.9 HEALTH EDUCATION/COUNSELING: ICD-10-CM

## 2021-07-16 DIAGNOSIS — C20 RECTAL CANCER METASTASIZED TO LIVER (HCC): ICD-10-CM

## 2021-07-16 DIAGNOSIS — C19 COLORECTAL CANCER, STAGE IV (HCC): Primary | ICD-10-CM

## 2021-07-16 LAB
ALBUMIN SERPL-MCNC: 3.6 G/DL (ref 3.4–5)
ALBUMIN/GLOB SERPL: 1 {RATIO} (ref 1–2)
ALP LIVER SERPL-CCNC: 90 U/L
ALT SERPL-CCNC: 33 U/L
ANION GAP SERPL CALC-SCNC: 2 MMOL/L (ref 0–18)
AST SERPL-CCNC: 29 U/L (ref 15–37)
BASOPHILS # BLD AUTO: 0.04 X10(3) UL (ref 0–0.2)
BASOPHILS NFR BLD AUTO: 0.5 %
BILIRUB SERPL-MCNC: 0.7 MG/DL (ref 0.1–2)
BILIRUB UR QL STRIP.AUTO: NEGATIVE
BUN BLD-MCNC: 12 MG/DL (ref 7–18)
BUN/CREAT SERPL: 16 (ref 10–20)
CALCIUM BLD-MCNC: 8.5 MG/DL (ref 8.5–10.1)
CEA SERPL-MCNC: 118.7 NG/ML (ref ?–5)
CHLORIDE SERPL-SCNC: 108 MMOL/L (ref 98–112)
CLARITY UR REFRACT.AUTO: CLEAR
CO2 SERPL-SCNC: 27 MMOL/L (ref 21–32)
COLOR UR AUTO: YELLOW
CREAT BLD-MCNC: 0.75 MG/DL
DEPRECATED RDW RBC AUTO: 40.2 FL (ref 35.1–46.3)
EOSINOPHIL # BLD AUTO: 0.22 X10(3) UL (ref 0–0.7)
EOSINOPHIL NFR BLD AUTO: 2.6 %
ERYTHROCYTE [DISTWIDTH] IN BLOOD BY AUTOMATED COUNT: 12.4 % (ref 11–15)
GLOBULIN PLAS-MCNC: 3.5 G/DL (ref 2.8–4.4)
GLUCOSE BLD-MCNC: 97 MG/DL (ref 70–99)
GLUCOSE UR STRIP.AUTO-MCNC: NEGATIVE MG/DL
HCT VFR BLD AUTO: 37.9 %
HGB BLD-MCNC: 13.6 G/DL
IMM GRANULOCYTES # BLD AUTO: 0.03 X10(3) UL (ref 0–1)
IMM GRANULOCYTES NFR BLD: 0.4 %
KETONES UR STRIP.AUTO-MCNC: NEGATIVE MG/DL
LEUKOCYTE ESTERASE UR QL STRIP.AUTO: NEGATIVE
LYMPHOCYTES # BLD AUTO: 0.95 X10(3) UL (ref 1–4)
LYMPHOCYTES NFR BLD AUTO: 11.3 %
M PROTEIN MFR SERPL ELPH: 7.1 G/DL (ref 6.4–8.2)
MCH RBC QN AUTO: 32 PG (ref 26–34)
MCHC RBC AUTO-ENTMCNC: 35.9 G/DL (ref 31–37)
MCV RBC AUTO: 89.2 FL
MONOCYTES # BLD AUTO: 0.7 X10(3) UL (ref 0.1–1)
MONOCYTES NFR BLD AUTO: 8.3 %
NEUTROPHILS # BLD AUTO: 6.46 X10 (3) UL (ref 1.5–7.7)
NEUTROPHILS # BLD AUTO: 6.46 X10(3) UL (ref 1.5–7.7)
NEUTROPHILS NFR BLD AUTO: 76.9 %
NITRITE UR QL STRIP.AUTO: NEGATIVE
OSMOLALITY SERPL CALC.SUM OF ELEC: 284 MOSM/KG (ref 275–295)
PH UR STRIP.AUTO: 7 [PH] (ref 5–8)
PLATELET # BLD AUTO: 251 10(3)UL (ref 150–450)
POTASSIUM SERPL-SCNC: 3.9 MMOL/L (ref 3.5–5.1)
PROT UR STRIP.AUTO-MCNC: NEGATIVE MG/DL
RBC # BLD AUTO: 4.25 X10(6)UL
RBC UR QL AUTO: NEGATIVE
SODIUM SERPL-SCNC: 137 MMOL/L (ref 136–145)
SP GR UR STRIP.AUTO: 1.01 (ref 1–1.03)
UROBILINOGEN UR STRIP.AUTO-MCNC: <2 MG/DL
WBC # BLD AUTO: 8.4 X10(3) UL (ref 4–11)

## 2021-07-16 PROCEDURE — 96411 CHEMO IV PUSH ADDL DRUG: CPT

## 2021-07-16 PROCEDURE — 96368 THER/DIAG CONCURRENT INF: CPT

## 2021-07-16 PROCEDURE — 99152 MOD SED SAME PHYS/QHP 5/>YRS: CPT

## 2021-07-16 PROCEDURE — 99215 OFFICE O/P EST HI 40 MIN: CPT | Performed by: NURSE PRACTITIONER

## 2021-07-16 PROCEDURE — 80053 COMPREHEN METABOLIC PANEL: CPT | Performed by: SPECIALIST

## 2021-07-16 PROCEDURE — 96415 CHEMO IV INFUSION ADDL HR: CPT

## 2021-07-16 PROCEDURE — 96413 CHEMO IV INFUSION 1 HR: CPT

## 2021-07-16 PROCEDURE — 96375 TX/PRO/DX INJ NEW DRUG ADDON: CPT

## 2021-07-16 PROCEDURE — 82378 CARCINOEMBRYONIC ANTIGEN: CPT

## 2021-07-16 PROCEDURE — 85025 COMPLETE CBC W/AUTO DIFF WBC: CPT | Performed by: SPECIALIST

## 2021-07-16 PROCEDURE — 36561 INSERT TUNNELED CV CATH: CPT

## 2021-07-16 PROCEDURE — 76937 US GUIDE VASCULAR ACCESS: CPT

## 2021-07-16 PROCEDURE — 81003 URINALYSIS AUTO W/O SCOPE: CPT | Performed by: SPECIALIST

## 2021-07-16 PROCEDURE — 96417 CHEMO IV INFUS EACH ADDL SEQ: CPT

## 2021-07-16 PROCEDURE — 77001 FLUOROGUIDE FOR VEIN DEVICE: CPT

## 2021-07-16 RX ORDER — PROCHLORPERAZINE MALEATE 10 MG
10 TABLET ORAL EVERY 6 HOURS PRN
Qty: 30 TABLET | Refills: 3 | Status: CANCELLED | OUTPATIENT
Start: 2021-07-16

## 2021-07-16 RX ORDER — PROCHLORPERAZINE MALEATE 10 MG
10 TABLET ORAL EVERY 6 HOURS PRN
Qty: 30 TABLET | Refills: 3 | Status: SHIPPED | OUTPATIENT
Start: 2021-07-16

## 2021-07-16 RX ORDER — FLUOROURACIL 50 MG/ML
2400 INJECTION, SOLUTION INTRAVENOUS CONTINUOUS
Status: DISCONTINUED | OUTPATIENT
Start: 2021-07-16 | End: 2021-07-16

## 2021-07-16 RX ORDER — FLUOROURACIL 50 MG/ML
400 INJECTION, SOLUTION INTRAVENOUS ONCE
Status: COMPLETED | OUTPATIENT
Start: 2021-07-16 | End: 2021-07-16

## 2021-07-16 RX ORDER — ONDANSETRON HYDROCHLORIDE 8 MG/1
8 TABLET, FILM COATED ORAL EVERY 8 HOURS PRN
Qty: 30 TABLET | Refills: 3 | Status: SHIPPED | OUTPATIENT
Start: 2021-07-16

## 2021-07-16 RX ORDER — ONDANSETRON HYDROCHLORIDE 8 MG/1
8 TABLET, FILM COATED ORAL EVERY 8 HOURS PRN
Qty: 30 TABLET | Refills: 3 | Status: CANCELLED | OUTPATIENT
Start: 2021-07-16

## 2021-07-16 RX ORDER — FLUOROURACIL 50 MG/ML
400 INJECTION, SOLUTION INTRAVENOUS ONCE
Status: CANCELLED
Start: 2021-07-16

## 2021-07-16 RX ORDER — FLUOROURACIL 50 MG/ML
2400 INJECTION, SOLUTION INTRAVENOUS CONTINUOUS
Status: CANCELLED | OUTPATIENT
Start: 2021-07-16

## 2021-07-16 RX ADMIN — FLUOROURACIL 4900 MG: 50 INJECTION, SOLUTION INTRAVENOUS at 14:54:00

## 2021-07-16 RX ADMIN — FLUOROURACIL 800 MG: 50 INJECTION, SOLUTION INTRAVENOUS at 14:45:00

## 2021-07-16 NOTE — PROGRESS NOTES
Patient presents with:  Pt Ed: Chemo education    Flouorouracil. Oxaliplatin, Leucovorin,Bevacizumab- Patient presents to clinic with family member for chemo education followed by treatment.     Education Record    Learner:  Patient and Family Member    Dise

## 2021-07-16 NOTE — PROGRESS NOTES
Pt here for C 1 D 1 .   Arrives Ambulating independently, accompanied by Self and Family member           Pregnancy screening: Not applicable    Modifications in dose or schedule: No     Frequency of blood return and site check throughout administration: Pr

## 2021-07-16 NOTE — PROGRESS NOTES
IV Chemotherapy Education    Drug names:  Fluorouracil, oxaliplatin, leucovorin, bevacizumab     Learner:  Patient and Family Member    Caregivers present: Daughter     Chemotherapy education goals:  · Learn the drug names  · Administration schedule  · Rou Services Sheet  Dietician information sheet      Patient and daughter were given ample opportunity to ask questions. All questions and concerns addressed. Chemotherapy Consent Form signed by the patient.     I spent a total of 60 minutes with the gato

## 2021-07-16 NOTE — PROGRESS NOTES
met with Patient in treatment room for introduction and role explanation. Patient shared that the last few weeks have been overwhelming with appointments and information.  He did get a second opinion which echoed what he received before, and chelle

## 2021-07-19 NOTE — PROGRESS NOTES
Nutrition Consultation    Patient Name: Radha Houser  YOB: 1955  Medical Record Number: [id number]   Account Number: [de-identified]  Dietitian: Alessandra Tomlinson RD, ALEJANDRA      Date of visit: 7/19/2021    Diet Rx: high protein/calorie as isabella

## 2021-07-23 ENCOUNTER — APPOINTMENT (OUTPATIENT)
Dept: HEMATOLOGY/ONCOLOGY | Facility: HOSPITAL | Age: 66
End: 2021-07-23
Attending: SPECIALIST
Payer: COMMERCIAL

## 2021-07-23 VITALS
TEMPERATURE: 98 F | OXYGEN SATURATION: 97 % | BODY MASS INDEX: 22 KG/M2 | SYSTOLIC BLOOD PRESSURE: 162 MMHG | DIASTOLIC BLOOD PRESSURE: 91 MMHG | RESPIRATION RATE: 16 BRPM | WEIGHT: 169.5 LBS | HEART RATE: 53 BPM

## 2021-07-23 DIAGNOSIS — C78.7 METASTASES TO THE LIVER (HCC): ICD-10-CM

## 2021-07-23 DIAGNOSIS — C78.7 LIVER METASTASES (HCC): ICD-10-CM

## 2021-07-23 DIAGNOSIS — Z51.11 CHEMOTHERAPY MANAGEMENT, ENCOUNTER FOR: ICD-10-CM

## 2021-07-23 DIAGNOSIS — C20 RECTAL CANCER METASTASIZED TO LIVER (HCC): Primary | ICD-10-CM

## 2021-07-23 DIAGNOSIS — C19 COLORECTAL CANCER, STAGE IV (HCC): ICD-10-CM

## 2021-07-23 DIAGNOSIS — C78.7 RECTAL CANCER METASTASIZED TO LIVER (HCC): Primary | ICD-10-CM

## 2021-07-23 PROCEDURE — 99215 OFFICE O/P EST HI 40 MIN: CPT | Performed by: SPECIALIST

## 2021-07-23 RX ORDER — GABAPENTIN 300 MG/1
300 CAPSULE ORAL 3 TIMES DAILY
COMMUNITY

## 2021-07-23 NOTE — PROGRESS NOTES
Patient is here today for follow up with Mirian Sacks for day 8 post initial FOLFOX6 + Bevacizumab chemotherapy. Patient denies pain. Stated fatigue one day post treatment. Denied nausea with antiemetics. Appetite is good. One day of cold sensitivity.  Medica

## 2021-07-23 NOTE — PROGRESS NOTES
[facility] Progress Note      Patient Name: Jessica Aguilar   YOB: 1955  Medical Record Number: [id number]  Attending Physician: Darling Leung. Shireen Gipson M.D.      Date of Visit: 7/23/2021      Chief Complaint  Metastatic adenoca physician)  No known family history of malignancy. Social History (historical data, reviewed by physician)  Denies history of tobacco use. Current Medications  gabapentin 300 MG Oral Cap, Take 300 mg by mouth 3 (three) times daily.  Currently ta today. Laboratory   No results found for this or any previous visit (from the past 24 hour(s)). Impression and Plan   1. Metastatic adenocarcinoma of rectum: Patient has stage IV disease. He is tolerating FOLFOX + bevacizumab well.  Response is nicholson

## 2021-07-24 RX ORDER — FLUOROURACIL 50 MG/ML
400 INJECTION, SOLUTION INTRAVENOUS ONCE
Status: CANCELLED
Start: 2021-07-30 | End: 2021-07-30

## 2021-07-24 RX ORDER — FLUOROURACIL 50 MG/ML
2400 INJECTION, SOLUTION INTRAVENOUS CONTINUOUS
Status: CANCELLED | OUTPATIENT
Start: 2021-07-30

## 2021-07-30 ENCOUNTER — OFFICE VISIT (OUTPATIENT)
Dept: HEMATOLOGY/ONCOLOGY | Facility: HOSPITAL | Age: 66
End: 2021-07-30
Attending: SPECIALIST
Payer: COMMERCIAL

## 2021-07-30 VITALS
TEMPERATURE: 97 F | RESPIRATION RATE: 18 BRPM | DIASTOLIC BLOOD PRESSURE: 90 MMHG | HEIGHT: 73.27 IN | BODY MASS INDEX: 22.21 KG/M2 | SYSTOLIC BLOOD PRESSURE: 161 MMHG | WEIGHT: 169.38 LBS | HEART RATE: 55 BPM | OXYGEN SATURATION: 98 %

## 2021-07-30 DIAGNOSIS — C19 COLORECTAL CANCER, STAGE IV (HCC): Primary | ICD-10-CM

## 2021-07-30 DIAGNOSIS — C78.7 RECTAL CANCER METASTASIZED TO LIVER (HCC): Primary | ICD-10-CM

## 2021-07-30 DIAGNOSIS — C20 RECTAL CANCER METASTASIZED TO LIVER (HCC): ICD-10-CM

## 2021-07-30 DIAGNOSIS — C20 RECTAL CANCER METASTASIZED TO LIVER (HCC): Primary | ICD-10-CM

## 2021-07-30 DIAGNOSIS — C78.7 RECTAL CANCER METASTASIZED TO LIVER (HCC): ICD-10-CM

## 2021-07-30 LAB
ALBUMIN SERPL-MCNC: 3.5 G/DL (ref 3.4–5)
ALBUMIN/GLOB SERPL: 1.1 {RATIO} (ref 1–2)
ALP LIVER SERPL-CCNC: 102 U/L
ALT SERPL-CCNC: 35 U/L
ANION GAP SERPL CALC-SCNC: 5 MMOL/L (ref 0–18)
AST SERPL-CCNC: 23 U/L (ref 15–37)
BASOPHILS # BLD AUTO: 0.04 X10(3) UL (ref 0–0.2)
BASOPHILS NFR BLD AUTO: 0.8 %
BILIRUB SERPL-MCNC: 0.7 MG/DL (ref 0.1–2)
BUN BLD-MCNC: 16 MG/DL (ref 7–18)
CALCIUM BLD-MCNC: 8.8 MG/DL (ref 8.5–10.1)
CEA SERPL-MCNC: 126.8 NG/ML (ref ?–5)
CHLORIDE SERPL-SCNC: 109 MMOL/L (ref 98–112)
CO2 SERPL-SCNC: 27 MMOL/L (ref 21–32)
CREAT BLD-MCNC: 0.79 MG/DL
EOSINOPHIL # BLD AUTO: 0.14 X10(3) UL (ref 0–0.7)
EOSINOPHIL NFR BLD AUTO: 2.7 %
ERYTHROCYTE [DISTWIDTH] IN BLOOD BY AUTOMATED COUNT: 13.4 %
GLOBULIN PLAS-MCNC: 3.3 G/DL (ref 2.8–4.4)
GLUCOSE BLD-MCNC: 88 MG/DL (ref 70–99)
HCT VFR BLD AUTO: 36.5 %
HGB BLD-MCNC: 12.9 G/DL
IMM GRANULOCYTES # BLD AUTO: 0.02 X10(3) UL (ref 0–1)
IMM GRANULOCYTES NFR BLD: 0.4 %
LYMPHOCYTES # BLD AUTO: 1.04 X10(3) UL (ref 1–4)
LYMPHOCYTES NFR BLD AUTO: 19.9 %
M PROTEIN MFR SERPL ELPH: 6.8 G/DL (ref 6.4–8.2)
MCH RBC QN AUTO: 31.9 PG (ref 26–34)
MCHC RBC AUTO-ENTMCNC: 35.3 G/DL (ref 31–37)
MCV RBC AUTO: 90.3 FL
MONOCYTES # BLD AUTO: 0.65 X10(3) UL (ref 0.1–1)
MONOCYTES NFR BLD AUTO: 12.5 %
NEUTROPHILS # BLD AUTO: 3.33 X10 (3) UL (ref 1.5–7.7)
NEUTROPHILS # BLD AUTO: 3.33 X10(3) UL (ref 1.5–7.7)
NEUTROPHILS NFR BLD AUTO: 63.7 %
OSMOLALITY SERPL CALC.SUM OF ELEC: 293 MOSM/KG (ref 275–295)
PATIENT FASTING Y/N/NP: NO
PLATELET # BLD AUTO: 247 10(3)UL (ref 150–450)
POTASSIUM SERPL-SCNC: 4.2 MMOL/L (ref 3.5–5.1)
RBC # BLD AUTO: 4.04 X10(6)UL
SODIUM SERPL-SCNC: 141 MMOL/L (ref 136–145)
WBC # BLD AUTO: 5.2 X10(3) UL (ref 4–11)

## 2021-07-30 PROCEDURE — 82378 CARCINOEMBRYONIC ANTIGEN: CPT

## 2021-07-30 PROCEDURE — 96367 TX/PROPH/DG ADDL SEQ IV INF: CPT

## 2021-07-30 PROCEDURE — 96375 TX/PRO/DX INJ NEW DRUG ADDON: CPT

## 2021-07-30 PROCEDURE — 96415 CHEMO IV INFUSION ADDL HR: CPT

## 2021-07-30 PROCEDURE — 85025 COMPLETE CBC W/AUTO DIFF WBC: CPT

## 2021-07-30 PROCEDURE — 99215 OFFICE O/P EST HI 40 MIN: CPT | Performed by: NURSE PRACTITIONER

## 2021-07-30 PROCEDURE — 96411 CHEMO IV PUSH ADDL DRUG: CPT

## 2021-07-30 PROCEDURE — 96417 CHEMO IV INFUS EACH ADDL SEQ: CPT

## 2021-07-30 PROCEDURE — 80053 COMPREHEN METABOLIC PANEL: CPT

## 2021-07-30 PROCEDURE — 96368 THER/DIAG CONCURRENT INF: CPT

## 2021-07-30 PROCEDURE — 96413 CHEMO IV INFUSION 1 HR: CPT

## 2021-07-30 RX ORDER — FLUOROURACIL 50 MG/ML
2400 INJECTION, SOLUTION INTRAVENOUS CONTINUOUS
Status: DISCONTINUED | OUTPATIENT
Start: 2021-07-30 | End: 2021-07-30

## 2021-07-30 RX ORDER — FLUOROURACIL 50 MG/ML
400 INJECTION, SOLUTION INTRAVENOUS ONCE
Status: COMPLETED | OUTPATIENT
Start: 2021-07-30 | End: 2021-07-30

## 2021-07-30 RX ADMIN — FLUOROURACIL 800 MG: 50 INJECTION, SOLUTION INTRAVENOUS at 14:01:00

## 2021-07-30 RX ADMIN — FLUOROURACIL 4900 MG: 50 INJECTION, SOLUTION INTRAVENOUS at 14:01:00

## 2021-07-30 NOTE — PROGRESS NOTES
Cancer Center Progress Note    Patient Name: Kathy Ruffin   YOB: 1955   Medical Record Number: [id number]   [facility]: [id number]   Date of visit: 7/30/2021     Chief Complaint/Reason for Visit:  Patient presents with:   Follow - Up     History Used    Vaping Use      Vaping Use: Never used    Substance and Sexual Activity      Alcohol use:  Yes        Alcohol/week: 3.0 - 4.0 standard drinks        Types: 3 - 4 Glasses of wine per week        Comment: socially      Drug use: Never      Sexual acti tablet (10 mg total) by mouth every 6 (six) hours as needed for Nausea., Disp: 30 tablet, Rfl: 3  •  Ondansetron HCl (ZOFRAN) 8 MG tablet, Take 1 tablet (8 mg total) by mouth every 8 (eight) hours as needed for Nausea., Disp: 30 tablet, Rfl: 3  •  PEG 3350 erythema or rash   Psych/Depression: mood and affect are appropriate.      Labs:     Recent Results (from the past 72 hour(s))   COMP METABOLIC PANEL (14)    Collection Time: 07/30/21  9:20 AM   Result Value Ref Range    Glucose 88 70 - 99 mg/dL    Sodium 1 reviewed, proceed with cycle 2.      Planned Follow Up: 2 weeks follow up with Dr. Austin Oquendo, labs and chemo    Risk Level: HIGH rectal cancer receiving chemotherapy requiring close monitoring       Electronically Signed by:    KASANDRA Roberts, SIVAN Nieves

## 2021-07-30 NOTE — PROGRESS NOTES
Education Record    Learner:  Patient    Disease / Diagnosis:Rectal CA    Barriers / Limitations:  None   Comments:    Method:  Discussion   Comments:    General Topics:  Plan of care reviewed   Comments:    Outcome:  Shows understanding   Comments:    Axel Jones

## 2021-07-30 NOTE — PROGRESS NOTES
Nutrition F/U Consultation     Patient Name: Randall Aguirre  YOB: 1955  Medical Record Number: [id number]      Account Number: [de-identified]  Dietitian: Car Harper RD, LDN     Date of visit: 7/30/2021     Diet Rx: high protein/calorie as

## 2021-07-30 NOTE — PROGRESS NOTES
Education Record    Learner:  Patient    Disease / Diagnosis:Rectal  CA    Barriers / Limitations:  None   Comments:    Method:  Discussion   Comments:    General Topics:  Plan of care reviewed   Comments:    Outcome:  Shows understanding   Comments:    Pa

## 2021-08-06 ENCOUNTER — TELEPHONE (OUTPATIENT)
Dept: HEMATOLOGY/ONCOLOGY | Facility: HOSPITAL | Age: 66
End: 2021-08-06

## 2021-08-06 ENCOUNTER — SOCIAL WORK SERVICES (OUTPATIENT)
Dept: HEMATOLOGY/ONCOLOGY | Facility: HOSPITAL | Age: 66
End: 2021-08-06

## 2021-08-06 NOTE — PROGRESS NOTES
spoke with Daughter Yaakov Ferris and provided education/information on enosiX, and community counseling. Daughter appreciative and aware to reach out with any questions.

## 2021-08-06 NOTE — TELEPHONE ENCOUNTER
Sheila Cody at [phone] is callling to discuss some resources with a  regarding her father's condition and get some Therapist information.

## 2021-08-10 ENCOUNTER — PATIENT MESSAGE (OUTPATIENT)
Dept: HEMATOLOGY/ONCOLOGY | Facility: HOSPITAL | Age: 66
End: 2021-08-10

## 2021-08-10 RX ORDER — SENNA AND DOCUSATE SODIUM 50; 8.6 MG/1; MG/1
2 TABLET, FILM COATED ORAL NIGHTLY
Qty: 60 TABLET | Refills: 0 | Status: SHIPPED | OUTPATIENT
Start: 2021-08-10

## 2021-08-10 NOTE — TELEPHONE ENCOUNTER
From: Beverly Hussein  To: Trey Urena MD  Sent: 8/10/2021 9:29 AM CDT  Subject: Joaquim Bowman,    I was hoping to get a refill of the Senna if possible to the same pharmacy. Thank you.

## 2021-08-12 RX ORDER — FLUOROURACIL 50 MG/ML
400 INJECTION, SOLUTION INTRAVENOUS ONCE
Status: CANCELLED
Start: 2021-08-13 | End: 2021-08-13

## 2021-08-12 RX ORDER — FLUOROURACIL 50 MG/ML
2400 INJECTION, SOLUTION INTRAVENOUS CONTINUOUS
Status: CANCELLED | OUTPATIENT
Start: 2021-08-13

## 2021-08-13 ENCOUNTER — OFFICE VISIT (OUTPATIENT)
Dept: HEMATOLOGY/ONCOLOGY | Facility: HOSPITAL | Age: 66
End: 2021-08-13
Attending: SPECIALIST
Payer: COMMERCIAL

## 2021-08-13 VITALS
RESPIRATION RATE: 18 BRPM | OXYGEN SATURATION: 96 % | SYSTOLIC BLOOD PRESSURE: 158 MMHG | HEIGHT: 73.27 IN | WEIGHT: 167.38 LBS | TEMPERATURE: 97 F | BODY MASS INDEX: 21.94 KG/M2 | DIASTOLIC BLOOD PRESSURE: 93 MMHG | HEART RATE: 56 BPM

## 2021-08-13 DIAGNOSIS — C19 COLORECTAL CANCER, STAGE IV (HCC): Primary | ICD-10-CM

## 2021-08-13 DIAGNOSIS — C20 RECTAL CANCER METASTASIZED TO LIVER (HCC): ICD-10-CM

## 2021-08-13 DIAGNOSIS — C78.7 RECTAL CANCER METASTASIZED TO LIVER (HCC): ICD-10-CM

## 2021-08-13 DIAGNOSIS — C78.7 METASTASES TO THE LIVER (HCC): ICD-10-CM

## 2021-08-13 DIAGNOSIS — C78.7 LIVER METASTASES (HCC): ICD-10-CM

## 2021-08-13 DIAGNOSIS — Z51.11 CHEMOTHERAPY MANAGEMENT, ENCOUNTER FOR: ICD-10-CM

## 2021-08-13 LAB
ALBUMIN SERPL-MCNC: 3.4 G/DL (ref 3.4–5)
ALBUMIN/GLOB SERPL: 0.9 {RATIO} (ref 1–2)
ALP LIVER SERPL-CCNC: 96 U/L
ALT SERPL-CCNC: 34 U/L
ANION GAP SERPL CALC-SCNC: 2 MMOL/L (ref 0–18)
AST SERPL-CCNC: 25 U/L (ref 15–37)
BASOPHILS # BLD AUTO: 0.04 X10(3) UL (ref 0–0.2)
BASOPHILS NFR BLD AUTO: 0.7 %
BILIRUB SERPL-MCNC: 0.7 MG/DL (ref 0.1–2)
BUN BLD-MCNC: 16 MG/DL (ref 7–18)
CALCIUM BLD-MCNC: 8.8 MG/DL (ref 8.5–10.1)
CEA SERPL-MCNC: 135.5 NG/ML (ref ?–5)
CHLORIDE SERPL-SCNC: 107 MMOL/L (ref 98–112)
CO2 SERPL-SCNC: 28 MMOL/L (ref 21–32)
CONTROL RUN WITHIN 24 HOURS?: NO
CREAT BLD-MCNC: 0.92 MG/DL
EOSINOPHIL # BLD AUTO: 0.05 X10(3) UL (ref 0–0.7)
EOSINOPHIL NFR BLD AUTO: 0.9 %
ERYTHROCYTE [DISTWIDTH] IN BLOOD BY AUTOMATED COUNT: 15 %
GLOBULIN PLAS-MCNC: 3.7 G/DL (ref 2.8–4.4)
GLUCOSE BLD-MCNC: 92 MG/DL (ref 70–99)
GLUCOSE URINE: NEGATIVE
HCT VFR BLD AUTO: 39.1 %
HGB BLD-MCNC: 13.9 G/DL
IMM GRANULOCYTES # BLD AUTO: 0.02 X10(3) UL (ref 0–1)
IMM GRANULOCYTES NFR BLD: 0.4 %
LEUKOCYTE ESTERASE URINE: NEGATIVE
LYMPHOCYTES # BLD AUTO: 1.29 X10(3) UL (ref 1–4)
LYMPHOCYTES NFR BLD AUTO: 23.5 %
M PROTEIN MFR SERPL ELPH: 7.1 G/DL (ref 6.4–8.2)
MCH RBC QN AUTO: 32.8 PG (ref 26–34)
MCHC RBC AUTO-ENTMCNC: 35.5 G/DL (ref 31–37)
MCV RBC AUTO: 92.2 FL
MONOCYTES # BLD AUTO: 0.77 X10(3) UL (ref 0.1–1)
MONOCYTES NFR BLD AUTO: 14 %
NEUTROPHILS # BLD AUTO: 3.32 X10 (3) UL (ref 1.5–7.7)
NEUTROPHILS # BLD AUTO: 3.32 X10(3) UL (ref 1.5–7.7)
NEUTROPHILS NFR BLD AUTO: 60.5 %
NITRITE URINE: NEGATIVE
OSMOLALITY SERPL CALC.SUM OF ELEC: 285 MOSM/KG (ref 275–295)
PATIENT FASTING Y/N/NP: NO
PLATELET # BLD AUTO: 204 10(3)UL (ref 150–450)
POTASSIUM SERPL-SCNC: 4.2 MMOL/L (ref 3.5–5.1)
PROTEIN URINE: 30
RBC # BLD AUTO: 4.24 X10(6)UL
SODIUM SERPL-SCNC: 137 MMOL/L (ref 136–145)
URINE CLARITY: CLEAR
WBC # BLD AUTO: 5.5 X10(3) UL (ref 4–11)

## 2021-08-13 PROCEDURE — 96375 TX/PRO/DX INJ NEW DRUG ADDON: CPT

## 2021-08-13 PROCEDURE — 96367 TX/PROPH/DG ADDL SEQ IV INF: CPT

## 2021-08-13 PROCEDURE — 96417 CHEMO IV INFUS EACH ADDL SEQ: CPT

## 2021-08-13 PROCEDURE — 85025 COMPLETE CBC W/AUTO DIFF WBC: CPT

## 2021-08-13 PROCEDURE — 80053 COMPREHEN METABOLIC PANEL: CPT

## 2021-08-13 PROCEDURE — 96411 CHEMO IV PUSH ADDL DRUG: CPT

## 2021-08-13 PROCEDURE — 99215 OFFICE O/P EST HI 40 MIN: CPT | Performed by: SPECIALIST

## 2021-08-13 PROCEDURE — 96415 CHEMO IV INFUSION ADDL HR: CPT

## 2021-08-13 PROCEDURE — 96413 CHEMO IV INFUSION 1 HR: CPT

## 2021-08-13 PROCEDURE — 96368 THER/DIAG CONCURRENT INF: CPT

## 2021-08-13 PROCEDURE — 82378 CARCINOEMBRYONIC ANTIGEN: CPT

## 2021-08-13 RX ORDER — FLUOROURACIL 50 MG/ML
2400 INJECTION, SOLUTION INTRAVENOUS CONTINUOUS
Status: DISCONTINUED | OUTPATIENT
Start: 2021-08-13 | End: 2021-08-13

## 2021-08-13 RX ORDER — FLUOROURACIL 50 MG/ML
400 INJECTION, SOLUTION INTRAVENOUS ONCE
Status: COMPLETED | OUTPATIENT
Start: 2021-08-13 | End: 2021-08-13

## 2021-08-13 RX ADMIN — FLUOROURACIL 800 MG: 50 INJECTION, SOLUTION INTRAVENOUS at 14:25:00

## 2021-08-13 RX ADMIN — FLUOROURACIL 4900 MG: 50 INJECTION, SOLUTION INTRAVENOUS at 14:44:00

## 2021-08-13 NOTE — PROGRESS NOTES
Pt here for C3D1 Folfoxiri.   Arrives Ambulating independently, accompanied by Self           Pregnancy screening: Not applicable    Modifications in dose or schedule: No     Frequency of blood return and site check throughout administration: Prior to [facility]

## 2021-08-13 NOTE — PROGRESS NOTES
[facility] Progress Note      Patient Name: Rosalino Champion   YOB: 1955  Medical Record Number: [id number]  Attending Physician: Richar Vasquez. Mariusz Morton M.D.      Date of Visit: 8/13/2021      Chief Complaint  Metastatic adenoca Current Medications  Senna-Docusate Sodium 8.6-50 MG Oral Tab, Take 2 tablets by mouth nightly. Can decrease to 1 tab or hold if diarrhea develops, Disp: 60 tablet, Rfl: 0  gabapentin 300 MG Oral Cap, Take 300 mg by mouth 3 (three) times daily.  Lizette Comment 10.1 mg/dL    Calculated Osmolality 285 275 - 295 mOsm/kg    GFR, Non- 87 >=60    GFR, -American 101 >=60    AST 25 15 - 37 U/L    ALT 34 16 - 61 U/L    Alkaline Phosphatase 96 45 - 117 U/L    Bilirubin, Total 0.7 0.1 - 2.0 mg/dL will return for follow up and treatment in 2 weeks. Electronically signed by:    Ja Yoo M.D.   Associate Medical Director of Oncology [facility], Aravind Lopez

## 2021-08-13 NOTE — PROGRESS NOTES
Patient is here today for follow up with Shira Fried for Rectal cancer with Liver Mets. C3D1 FOLFOXIRI. Fadumo Shipley Patient denies pain. Stated mild fatigue for a few days post treatment. Nausea post treatment  Controlled with antiemetics.  Constipation - takes Senna

## 2021-08-19 ENCOUNTER — HOSPITAL ENCOUNTER (OUTPATIENT)
Dept: MRI IMAGING | Facility: HOSPITAL | Age: 66
Discharge: HOME | End: 2021-08-19
Payer: COMMERCIAL

## 2021-08-19 ENCOUNTER — HOSPITAL ENCOUNTER (OUTPATIENT)
Dept: CT IMAGING | Facility: HOSPITAL | Age: 66
End: 2021-08-19
Attending: SPECIALIST
Payer: COMMERCIAL

## 2021-08-19 DIAGNOSIS — C19 COLORECTAL CANCER, STAGE IV (HCC): ICD-10-CM

## 2021-08-19 PROCEDURE — A9575 INJ GADOTERATE MEGLUMI 0.1ML: HCPCS

## 2021-08-19 PROCEDURE — 72197 MRI PELVIS W/O & W/DYE: CPT | Performed by: COLON & RECTAL SURGERY

## 2021-08-19 PROCEDURE — 72197 MRI PELVIS W/O & W/DYE: CPT

## 2021-08-25 ENCOUNTER — HOSPITAL ENCOUNTER (OUTPATIENT)
Dept: CT IMAGING | Facility: HOSPITAL | Age: 66
Discharge: HOME | End: 2021-08-25
Attending: SPECIALIST
Payer: COMMERCIAL

## 2021-08-25 DIAGNOSIS — C78.7 RECTAL CANCER METASTASIZED TO LIVER (HCC): ICD-10-CM

## 2021-08-25 DIAGNOSIS — C19 COLORECTAL CANCER, STAGE IV (HCC): ICD-10-CM

## 2021-08-25 DIAGNOSIS — C20 RECTAL CANCER METASTASIZED TO LIVER (HCC): ICD-10-CM

## 2021-08-25 PROCEDURE — 74177 CT ABD & PELVIS W/CONTRAST: CPT | Performed by: SPECIALIST

## 2021-08-25 PROCEDURE — 71260 CT THORAX DX C+: CPT | Performed by: SPECIALIST

## 2021-08-25 RX ORDER — FLUOROURACIL 50 MG/ML
2400 INJECTION, SOLUTION INTRAVENOUS CONTINUOUS
Status: CANCELLED | OUTPATIENT
Start: 2021-08-27

## 2021-08-25 RX ORDER — FLUOROURACIL 50 MG/ML
400 INJECTION, SOLUTION INTRAVENOUS ONCE
Status: CANCELLED
Start: 2021-08-27 | End: 2021-08-27

## 2021-08-25 NOTE — PROGRESS NOTES
[facility] Progress Note      Patient Name: Mary Anne Valderrama   YOB: 1955  Medical Record Number: [id number]  Attending Physician: Lucie Birmingham. Camacho Ramirez M.D.      Date of Visit: 8/27/2021      Chief Complaint  Metastatic adenoca Current Medications  docusate sodium 100 MG Oral Cap, Take 1 capsule (100 mg total) by mouth 2 (two) times daily. , Disp: 60 capsule, Rfl: 0  Senna-Docusate Sodium 8.6-50 MG Oral Tab, Take 2 tablets by mouth nightly.  Can decrease to 1 tab or hold if d METABOLIC PANEL (14)    Collection Time: 08/27/21  9:05 AM   Result Value Ref Range    Glucose 92 70 - 99 mg/dL    Sodium 139 136 - 145 mmol/L    Potassium 4.2 3.5 - 5.1 mmol/L    Chloride 108 98 - 112 mmol/L    CO2 28.0 21.0 - 32.0 mmol/L    Anion Gap 3 0 hepatic lesions appear more necrotic. Lung nodules are stable. Impression and Plan   1. Metastatic adenocarcinoma of rectum: Patient has stage IV disease. He is tolerating FOLFOX + bevacizumab well. CT imaging suggests response. CEA stable.  Proceed wi

## 2021-08-27 ENCOUNTER — OFFICE VISIT (OUTPATIENT)
Dept: HEMATOLOGY/ONCOLOGY | Facility: HOSPITAL | Age: 66
End: 2021-08-27
Attending: SPECIALIST
Payer: COMMERCIAL

## 2021-08-27 VITALS
OXYGEN SATURATION: 96 % | SYSTOLIC BLOOD PRESSURE: 163 MMHG | WEIGHT: 169 LBS | HEIGHT: 73.27 IN | RESPIRATION RATE: 19 BRPM | TEMPERATURE: 96 F | DIASTOLIC BLOOD PRESSURE: 90 MMHG | BODY MASS INDEX: 22.16 KG/M2 | HEART RATE: 60 BPM

## 2021-08-27 DIAGNOSIS — C19 COLORECTAL CANCER, STAGE IV (HCC): Primary | ICD-10-CM

## 2021-08-27 DIAGNOSIS — Z51.11 CHEMOTHERAPY MANAGEMENT, ENCOUNTER FOR: Primary | ICD-10-CM

## 2021-08-27 DIAGNOSIS — C19 COLORECTAL CANCER, STAGE IV (HCC): ICD-10-CM

## 2021-08-27 DIAGNOSIS — C78.7 METASTASES TO THE LIVER (HCC): ICD-10-CM

## 2021-08-27 DIAGNOSIS — C78.7 LIVER METASTASES (HCC): ICD-10-CM

## 2021-08-27 DIAGNOSIS — C20 RECTAL CANCER METASTASIZED TO LIVER (HCC): ICD-10-CM

## 2021-08-27 DIAGNOSIS — C78.7 RECTAL CANCER METASTASIZED TO LIVER (HCC): ICD-10-CM

## 2021-08-27 LAB
ALBUMIN SERPL-MCNC: 3.2 G/DL (ref 3.4–5)
ALBUMIN/GLOB SERPL: 0.9 {RATIO} (ref 1–2)
ALP LIVER SERPL-CCNC: 82 U/L
ALT SERPL-CCNC: 31 U/L
ANION GAP SERPL CALC-SCNC: 3 MMOL/L (ref 0–18)
AST SERPL-CCNC: 19 U/L (ref 15–37)
BASOPHILS # BLD AUTO: 0.04 X10(3) UL (ref 0–0.2)
BASOPHILS NFR BLD AUTO: 0.8 %
BILIRUB SERPL-MCNC: 0.6 MG/DL (ref 0.1–2)
BUN BLD-MCNC: 15 MG/DL (ref 7–18)
CALCIUM BLD-MCNC: 8.7 MG/DL (ref 8.5–10.1)
CEA SERPL-MCNC: 136.9 NG/ML (ref ?–5)
CHLORIDE SERPL-SCNC: 108 MMOL/L (ref 98–112)
CO2 SERPL-SCNC: 28 MMOL/L (ref 21–32)
CREAT BLD-MCNC: 0.78 MG/DL
EOSINOPHIL # BLD AUTO: 0.06 X10(3) UL (ref 0–0.7)
EOSINOPHIL NFR BLD AUTO: 1.2 %
ERYTHROCYTE [DISTWIDTH] IN BLOOD BY AUTOMATED COUNT: 16 %
GLOBULIN PLAS-MCNC: 3.5 G/DL (ref 2.8–4.4)
GLUCOSE BLD-MCNC: 92 MG/DL (ref 70–99)
HCT VFR BLD AUTO: 38.4 %
HGB BLD-MCNC: 13.8 G/DL
IMM GRANULOCYTES # BLD AUTO: 0.01 X10(3) UL (ref 0–1)
IMM GRANULOCYTES NFR BLD: 0.2 %
LYMPHOCYTES # BLD AUTO: 1.04 X10(3) UL (ref 1–4)
LYMPHOCYTES NFR BLD AUTO: 20.7 %
M PROTEIN MFR SERPL ELPH: 6.7 G/DL (ref 6.4–8.2)
MCH RBC QN AUTO: 33.8 PG (ref 26–34)
MCHC RBC AUTO-ENTMCNC: 35.9 G/DL (ref 31–37)
MCV RBC AUTO: 94.1 FL
MONOCYTES # BLD AUTO: 0.73 X10(3) UL (ref 0.1–1)
MONOCYTES NFR BLD AUTO: 14.5 %
NEUTROPHILS # BLD AUTO: 3.14 X10 (3) UL (ref 1.5–7.7)
NEUTROPHILS # BLD AUTO: 3.14 X10(3) UL (ref 1.5–7.7)
NEUTROPHILS NFR BLD AUTO: 62.6 %
OSMOLALITY SERPL CALC.SUM OF ELEC: 288 MOSM/KG (ref 275–295)
PATIENT FASTING Y/N/NP: NO
PLATELET # BLD AUTO: 182 10(3)UL (ref 150–450)
POTASSIUM SERPL-SCNC: 4.2 MMOL/L (ref 3.5–5.1)
RBC # BLD AUTO: 4.08 X10(6)UL
SODIUM SERPL-SCNC: 139 MMOL/L (ref 136–145)
WBC # BLD AUTO: 5 X10(3) UL (ref 4–11)

## 2021-08-27 PROCEDURE — 96368 THER/DIAG CONCURRENT INF: CPT

## 2021-08-27 PROCEDURE — 96375 TX/PRO/DX INJ NEW DRUG ADDON: CPT

## 2021-08-27 PROCEDURE — 96413 CHEMO IV INFUSION 1 HR: CPT

## 2021-08-27 PROCEDURE — 96411 CHEMO IV PUSH ADDL DRUG: CPT

## 2021-08-27 PROCEDURE — 99215 OFFICE O/P EST HI 40 MIN: CPT | Performed by: SPECIALIST

## 2021-08-27 PROCEDURE — 96417 CHEMO IV INFUS EACH ADDL SEQ: CPT

## 2021-08-27 PROCEDURE — 82378 CARCINOEMBRYONIC ANTIGEN: CPT

## 2021-08-27 PROCEDURE — 96367 TX/PROPH/DG ADDL SEQ IV INF: CPT

## 2021-08-27 PROCEDURE — 80053 COMPREHEN METABOLIC PANEL: CPT

## 2021-08-27 PROCEDURE — 96415 CHEMO IV INFUSION ADDL HR: CPT

## 2021-08-27 PROCEDURE — 85025 COMPLETE CBC W/AUTO DIFF WBC: CPT

## 2021-08-27 RX ORDER — FLUOROURACIL 50 MG/ML
400 INJECTION, SOLUTION INTRAVENOUS ONCE
Status: COMPLETED | OUTPATIENT
Start: 2021-08-27 | End: 2021-08-27

## 2021-08-27 RX ORDER — DOCUSATE SODIUM 100 MG/1
100 CAPSULE, LIQUID FILLED ORAL 2 TIMES DAILY
Qty: 60 CAPSULE | Refills: 0 | Status: SHIPPED | OUTPATIENT
Start: 2021-08-27

## 2021-08-27 RX ORDER — FLUOROURACIL 50 MG/ML
2400 INJECTION, SOLUTION INTRAVENOUS CONTINUOUS
Status: DISCONTINUED | OUTPATIENT
Start: 2021-08-27 | End: 2021-08-27

## 2021-08-27 RX ADMIN — FLUOROURACIL 800 MG: 50 INJECTION, SOLUTION INTRAVENOUS at 13:28:00

## 2021-08-27 RX ADMIN — FLUOROURACIL 4900 MG: 50 INJECTION, SOLUTION INTRAVENOUS at 13:46:00

## 2021-08-27 NOTE — PROGRESS NOTES
Patient is here today for follow up with Mitali Easton for Rectal Cancer - FOLFOX6 + Bevacizumab. Patient stated pain is a 4-5 on a scale of 0-10. Denies nausea post treatment with antiemetics. Appetite is good.  Cold sensitivity lasting one week post treatmen

## 2021-08-29 ENCOUNTER — APPOINTMENT (OUTPATIENT)
Dept: HEMATOLOGY/ONCOLOGY | Facility: HOSPITAL | Age: 66
End: 2021-08-29
Attending: SPECIALIST
Payer: COMMERCIAL

## 2021-09-01 NOTE — PROGRESS NOTES
Nutrition F/U Consultation     Patient Name: Cresencio Baker  YOB: 1955  Medical Record Number: [id number]      Account Number: [de-identified]  Dietitian: Lida Arreaga RD, LDN     Date of visit: 08/27/2021     Diet Rx: high protein/calorie as

## 2021-09-09 RX ORDER — FLUOROURACIL 50 MG/ML
2400 INJECTION, SOLUTION INTRAVENOUS CONTINUOUS
Status: CANCELLED | OUTPATIENT
Start: 2021-09-10

## 2021-09-09 RX ORDER — FLUOROURACIL 50 MG/ML
400 INJECTION, SOLUTION INTRAVENOUS ONCE
Status: CANCELLED
Start: 2021-09-10 | End: 2021-09-10

## 2021-09-10 ENCOUNTER — OFFICE VISIT (OUTPATIENT)
Dept: HEMATOLOGY/ONCOLOGY | Facility: HOSPITAL | Age: 66
End: 2021-09-10
Attending: SPECIALIST
Payer: COMMERCIAL

## 2021-09-10 VITALS
BODY MASS INDEX: 22.03 KG/M2 | SYSTOLIC BLOOD PRESSURE: 178 MMHG | HEART RATE: 68 BPM | WEIGHT: 168 LBS | OXYGEN SATURATION: 97 % | DIASTOLIC BLOOD PRESSURE: 98 MMHG | RESPIRATION RATE: 16 BRPM | HEIGHT: 73.27 IN | TEMPERATURE: 97 F

## 2021-09-10 DIAGNOSIS — Z51.11 CHEMOTHERAPY MANAGEMENT, ENCOUNTER FOR: ICD-10-CM

## 2021-09-10 DIAGNOSIS — C19 COLORECTAL CANCER, STAGE IV (HCC): Primary | ICD-10-CM

## 2021-09-10 DIAGNOSIS — C78.7 METASTASES TO THE LIVER (HCC): ICD-10-CM

## 2021-09-10 DIAGNOSIS — C78.7 RECTAL CANCER METASTASIZED TO LIVER (HCC): ICD-10-CM

## 2021-09-10 DIAGNOSIS — C20 RECTAL CANCER METASTASIZED TO LIVER (HCC): ICD-10-CM

## 2021-09-10 LAB
ALBUMIN SERPL-MCNC: 3.4 G/DL (ref 3.4–5)
ALBUMIN/GLOB SERPL: 1 {RATIO} (ref 1–2)
ALP LIVER SERPL-CCNC: 72 U/L
ALT SERPL-CCNC: 29 U/L
ANION GAP SERPL CALC-SCNC: 2 MMOL/L (ref 0–18)
AST SERPL-CCNC: 22 U/L (ref 15–37)
BASOPHILS # BLD AUTO: 0.03 X10(3) UL (ref 0–0.2)
BASOPHILS NFR BLD AUTO: 0.7 %
BILIRUB SERPL-MCNC: 0.8 MG/DL (ref 0.1–2)
BUN BLD-MCNC: 11 MG/DL (ref 7–18)
CALCIUM BLD-MCNC: 8.6 MG/DL (ref 8.5–10.1)
CEA SERPL-MCNC: 126.4 NG/ML (ref ?–5)
CHLORIDE SERPL-SCNC: 109 MMOL/L (ref 98–112)
CO2 SERPL-SCNC: 27 MMOL/L (ref 21–32)
CREAT BLD-MCNC: 0.8 MG/DL
EOSINOPHIL # BLD AUTO: 0.06 X10(3) UL (ref 0–0.7)
EOSINOPHIL NFR BLD AUTO: 1.4 %
ERYTHROCYTE [DISTWIDTH] IN BLOOD BY AUTOMATED COUNT: 16.3 %
GLOBULIN PLAS-MCNC: 3.3 G/DL (ref 2.8–4.4)
GLUCOSE BLD-MCNC: 94 MG/DL (ref 70–99)
HCT VFR BLD AUTO: 39.7 %
HGB BLD-MCNC: 14 G/DL
IMM GRANULOCYTES # BLD AUTO: 0.01 X10(3) UL (ref 0–1)
IMM GRANULOCYTES NFR BLD: 0.2 %
LYMPHOCYTES # BLD AUTO: 1.09 X10(3) UL (ref 1–4)
LYMPHOCYTES NFR BLD AUTO: 24.9 %
M PROTEIN MFR SERPL ELPH: 6.7 G/DL (ref 6.4–8.2)
MCH RBC QN AUTO: 33.7 PG (ref 26–34)
MCHC RBC AUTO-ENTMCNC: 35.3 G/DL (ref 31–37)
MCV RBC AUTO: 95.4 FL
MONOCYTES # BLD AUTO: 0.74 X10(3) UL (ref 0.1–1)
MONOCYTES NFR BLD AUTO: 16.9 %
NEUTROPHILS # BLD AUTO: 2.45 X10 (3) UL (ref 1.5–7.7)
NEUTROPHILS # BLD AUTO: 2.45 X10(3) UL (ref 1.5–7.7)
NEUTROPHILS NFR BLD AUTO: 55.9 %
OSMOLALITY SERPL CALC.SUM OF ELEC: 285 MOSM/KG (ref 275–295)
PLATELET # BLD AUTO: 175 10(3)UL (ref 150–450)
POTASSIUM SERPL-SCNC: 4.1 MMOL/L (ref 3.5–5.1)
RBC # BLD AUTO: 4.16 X10(6)UL
SODIUM SERPL-SCNC: 138 MMOL/L (ref 136–145)
WBC # BLD AUTO: 4.4 X10(3) UL (ref 4–11)

## 2021-09-10 PROCEDURE — 96549 UNLISTED CHEMOTHERAPY PX: CPT

## 2021-09-10 PROCEDURE — 96413 CHEMO IV INFUSION 1 HR: CPT

## 2021-09-10 PROCEDURE — 96415 CHEMO IV INFUSION ADDL HR: CPT

## 2021-09-10 PROCEDURE — 96375 TX/PRO/DX INJ NEW DRUG ADDON: CPT

## 2021-09-10 PROCEDURE — 99215 OFFICE O/P EST HI 40 MIN: CPT | Performed by: SPECIALIST

## 2021-09-10 PROCEDURE — 80053 COMPREHEN METABOLIC PANEL: CPT

## 2021-09-10 PROCEDURE — 96368 THER/DIAG CONCURRENT INF: CPT

## 2021-09-10 PROCEDURE — 96411 CHEMO IV PUSH ADDL DRUG: CPT

## 2021-09-10 PROCEDURE — 82378 CARCINOEMBRYONIC ANTIGEN: CPT

## 2021-09-10 PROCEDURE — 96367 TX/PROPH/DG ADDL SEQ IV INF: CPT

## 2021-09-10 PROCEDURE — 85025 COMPLETE CBC W/AUTO DIFF WBC: CPT

## 2021-09-10 RX ORDER — FLUOROURACIL 50 MG/ML
400 INJECTION, SOLUTION INTRAVENOUS ONCE
Status: COMPLETED | OUTPATIENT
Start: 2021-09-10 | End: 2021-09-10

## 2021-09-10 RX ORDER — FLUOROURACIL 50 MG/ML
2400 INJECTION, SOLUTION INTRAVENOUS CONTINUOUS
Status: DISCONTINUED | OUTPATIENT
Start: 2021-09-10 | End: 2021-09-10

## 2021-09-10 RX ADMIN — FLUOROURACIL 4900 MG: 50 INJECTION, SOLUTION INTRAVENOUS at 13:30:00

## 2021-09-10 RX ADMIN — FLUOROURACIL 800 MG: 50 INJECTION, SOLUTION INTRAVENOUS at 13:22:00

## 2021-09-10 NOTE — PROGRESS NOTES
Pt here for C 5 D 1 .   Arrives Ambulating independently, accompanied by Self and Family member           Pregnancy screening: Not applicable    Modifications in dose or schedule: No     Frequency of blood return and site check throughout administration: Pr

## 2021-09-11 NOTE — PROGRESS NOTES
[facility] Progress Note      Patient Name: Jennifer Suazo   YOB: 1955  Medical Record Number: [id number]  Attending Physician: Eri Lou. Ashley Garner M.D.      Date of Visit: 9/10/2021      Chief Complaint  Metastatic adenoca Current Medications  docusate sodium 100 MG Oral Cap, Take 1 capsule (100 mg total) by mouth 2 (two) times daily. , Disp: 60 capsule, Rfl: 0  Senna-Docusate Sodium 8.6-50 MG Oral Tab, Take 2 tablets by mouth nightly.  Can decrease to 1 tab or hold if d (H) <=5.0 ng/mL   COMP METABOLIC PANEL (14)    Collection Time: 09/10/21  8:17 AM   Result Value Ref Range    Glucose 94 70 - 99 mg/dL    Sodium 138 136 - 145 mmol/L    Potassium 4.1 3.5 - 5.1 mmol/L    Chloride 109 98 - 112 mmol/L    CO2 27.0 21.0 - 32.0 comparison to previous CT scan, the hepatic lesions appear more necrotic. Lung nodules are stable. Impression and Plan   1. Metastatic adenocarcinoma of rectum: Patient has stage IV disease. He is tolerating FOLFOX + bevacizumab well. CEA is lower.  Co
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
## 2021-09-24 ENCOUNTER — OFFICE VISIT (OUTPATIENT)
Dept: HEMATOLOGY/ONCOLOGY | Facility: HOSPITAL | Age: 66
End: 2021-09-24
Attending: SPECIALIST
Payer: COMMERCIAL

## 2021-09-24 VITALS
WEIGHT: 168.38 LBS | SYSTOLIC BLOOD PRESSURE: 161 MMHG | HEART RATE: 64 BPM | HEIGHT: 73.27 IN | BODY MASS INDEX: 22.08 KG/M2 | RESPIRATION RATE: 18 BRPM | OXYGEN SATURATION: 95 % | DIASTOLIC BLOOD PRESSURE: 87 MMHG | TEMPERATURE: 98 F

## 2021-09-24 DIAGNOSIS — C19 COLORECTAL CANCER, STAGE IV (HCC): Primary | ICD-10-CM

## 2021-09-24 DIAGNOSIS — C78.7 METASTASES TO THE LIVER (HCC): ICD-10-CM

## 2021-09-24 DIAGNOSIS — C20 RECTAL CANCER METASTASIZED TO LIVER (HCC): ICD-10-CM

## 2021-09-24 DIAGNOSIS — Z51.11 CHEMOTHERAPY MANAGEMENT, ENCOUNTER FOR: ICD-10-CM

## 2021-09-24 DIAGNOSIS — C78.7 RECTAL CANCER METASTASIZED TO LIVER (HCC): ICD-10-CM

## 2021-09-24 DIAGNOSIS — C78.7 LIVER METASTASES (HCC): ICD-10-CM

## 2021-09-24 LAB
ALBUMIN SERPL-MCNC: 3.2 G/DL (ref 3.4–5)
ALBUMIN/GLOB SERPL: 0.9 {RATIO} (ref 1–2)
ALP LIVER SERPL-CCNC: 80 U/L
ALT SERPL-CCNC: 27 U/L
ANION GAP SERPL CALC-SCNC: 5 MMOL/L (ref 0–18)
AST SERPL-CCNC: 22 U/L (ref 15–37)
BASOPHILS # BLD AUTO: 0.03 X10(3) UL (ref 0–0.2)
BASOPHILS NFR BLD AUTO: 0.7 %
BILIRUB SERPL-MCNC: 0.8 MG/DL (ref 0.1–2)
BUN BLD-MCNC: 13 MG/DL (ref 7–18)
CALCIUM BLD-MCNC: 8.9 MG/DL (ref 8.5–10.1)
CEA SERPL-MCNC: 127 NG/ML (ref ?–5)
CHLORIDE SERPL-SCNC: 111 MMOL/L (ref 98–112)
CO2 SERPL-SCNC: 24 MMOL/L (ref 21–32)
CONTROL RUN WITHIN 24 HOURS?: YES
CREAT BLD-MCNC: 0.82 MG/DL
EOSINOPHIL # BLD AUTO: 0.05 X10(3) UL (ref 0–0.7)
EOSINOPHIL NFR BLD AUTO: 1.2 %
ERYTHROCYTE [DISTWIDTH] IN BLOOD BY AUTOMATED COUNT: 16.2 %
GLOBULIN PLAS-MCNC: 3.7 G/DL (ref 2.8–4.4)
GLUCOSE BLD-MCNC: 88 MG/DL (ref 70–99)
HCT VFR BLD AUTO: 39.3 %
HGB BLD-MCNC: 14.1 G/DL
IMM GRANULOCYTES # BLD AUTO: 0.02 X10(3) UL (ref 0–1)
IMM GRANULOCYTES NFR BLD: 0.5 %
LYMPHOCYTES # BLD AUTO: 0.97 X10(3) UL (ref 1–4)
LYMPHOCYTES NFR BLD AUTO: 22.7 %
MCH RBC QN AUTO: 34.4 PG (ref 26–34)
MCHC RBC AUTO-ENTMCNC: 35.9 G/DL (ref 31–37)
MCV RBC AUTO: 95.9 FL
MONOCYTES # BLD AUTO: 0.72 X10(3) UL (ref 0.1–1)
MONOCYTES NFR BLD AUTO: 16.8 %
NEUTROPHILS # BLD AUTO: 2.49 X10 (3) UL (ref 1.5–7.7)
NEUTROPHILS # BLD AUTO: 2.49 X10(3) UL (ref 1.5–7.7)
NEUTROPHILS NFR BLD AUTO: 58.1 %
OSMOLALITY SERPL CALC.SUM OF ELEC: 290 MOSM/KG (ref 275–295)
PLATELET # BLD AUTO: 159 10(3)UL (ref 150–450)
POTASSIUM SERPL-SCNC: 3.8 MMOL/L (ref 3.5–5.1)
PROT SERPL-MCNC: 6.9 G/DL (ref 6.4–8.2)
PROTEIN URINE: NEGATIVE
RBC # BLD AUTO: 4.1 X10(6)UL
SODIUM SERPL-SCNC: 140 MMOL/L (ref 136–145)
WBC # BLD AUTO: 4.3 X10(3) UL (ref 4–11)

## 2021-09-24 PROCEDURE — 80053 COMPREHEN METABOLIC PANEL: CPT

## 2021-09-24 PROCEDURE — 96417 CHEMO IV INFUS EACH ADDL SEQ: CPT

## 2021-09-24 PROCEDURE — 85025 COMPLETE CBC W/AUTO DIFF WBC: CPT

## 2021-09-24 PROCEDURE — 82378 CARCINOEMBRYONIC ANTIGEN: CPT

## 2021-09-24 PROCEDURE — 96411 CHEMO IV PUSH ADDL DRUG: CPT

## 2021-09-24 PROCEDURE — 96415 CHEMO IV INFUSION ADDL HR: CPT

## 2021-09-24 PROCEDURE — 96375 TX/PRO/DX INJ NEW DRUG ADDON: CPT

## 2021-09-24 PROCEDURE — 99215 OFFICE O/P EST HI 40 MIN: CPT | Performed by: SPECIALIST

## 2021-09-24 PROCEDURE — 96368 THER/DIAG CONCURRENT INF: CPT

## 2021-09-24 PROCEDURE — 96367 TX/PROPH/DG ADDL SEQ IV INF: CPT

## 2021-09-24 PROCEDURE — 96413 CHEMO IV INFUSION 1 HR: CPT

## 2021-09-24 RX ORDER — FLUOROURACIL 50 MG/ML
400 INJECTION, SOLUTION INTRAVENOUS ONCE
Status: COMPLETED | OUTPATIENT
Start: 2021-09-24 | End: 2021-09-24

## 2021-09-24 RX ORDER — FLUOROURACIL 50 MG/ML
2400 INJECTION, SOLUTION INTRAVENOUS CONTINUOUS
Status: CANCELLED | OUTPATIENT
Start: 2021-09-24

## 2021-09-24 RX ORDER — FLUOROURACIL 50 MG/ML
2400 INJECTION, SOLUTION INTRAVENOUS CONTINUOUS
Status: DISCONTINUED | OUTPATIENT
Start: 2021-09-24 | End: 2021-09-24

## 2021-09-24 RX ORDER — FLUOROURACIL 50 MG/ML
400 INJECTION, SOLUTION INTRAVENOUS ONCE
Status: CANCELLED
Start: 2021-09-24 | End: 2021-09-24

## 2021-09-24 RX ADMIN — FLUOROURACIL 800 MG: 50 INJECTION, SOLUTION INTRAVENOUS at 13:03:00

## 2021-09-24 RX ADMIN — FLUOROURACIL 4900 MG: 50 INJECTION, SOLUTION INTRAVENOUS at 13:13:00

## 2021-09-24 NOTE — PROGRESS NOTES
THE [facility] Progress Note      Patient Name: Nisa Sutton   YOB: 1955  Medical Record Number: [id number]  Attending Physician: Abigail Conklin. Claudine Alvarado M.D.      Date of Visit: 9/24/2021      Chief Complaint  Metastatic adenoca Current Medications  docusate sodium 100 MG Oral Cap, Take 1 capsule (100 mg total) by mouth 2 (two) times daily. , Disp: 60 capsule, Rfl: 0  Senna-Docusate Sodium 8.6-50 MG Oral Tab, Take 2 tablets by mouth nightly.  Can decrease to 1 tab or hold if d No lower extremity edema. Neurologic           Motor and sensory grossly intact. Psychiatric          Mood and affect appropriate.       Laboratory   Recent Results (from the past 24 hour(s))   CEA    Collection Time: 09/24/21  8:06 AM   Result Value Ref DIPSTICK    Collection Time: 09/24/21  9:11 AM   Result Value Ref Range    control run Yes     Urine Color      Urine clarity      Glucose, urine      Bilirubin urine      Ketone urine      Specific gravity      Blood Urine      PH URINE      Protein urine

## 2021-09-24 NOTE — PROGRESS NOTES
Pt here for C6D1 FOLFOX.   Arrives Ambulating independently, accompanied by Self           Pregnancy screening: Not applicable    Modifications in dose or schedule: No     Frequency of blood return and site check throughout administration: Prior to administ

## 2021-09-26 ENCOUNTER — NURSE ONLY (OUTPATIENT)
Dept: HEMATOLOGY/ONCOLOGY | Facility: HOSPITAL | Age: 66
End: 2021-09-26
Attending: SPECIALIST
Payer: COMMERCIAL

## 2021-09-26 PROCEDURE — 96523 IRRIG DRUG DELIVERY DEVICE: CPT

## 2021-09-28 RX ORDER — FLUOROURACIL 50 MG/ML
400 INJECTION, SOLUTION INTRAVENOUS ONCE
Start: 2021-10-08 | End: 2021-10-08

## 2021-09-28 RX ORDER — FLUOROURACIL 50 MG/ML
2400 INJECTION, SOLUTION INTRAVENOUS CONTINUOUS
OUTPATIENT
Start: 2021-10-08

## 2021-10-01 ENCOUNTER — TELEPHONE (OUTPATIENT)
Dept: HEMATOLOGY/ONCOLOGY | Facility: HOSPITAL | Age: 66
End: 2021-10-01

## 2021-10-01 NOTE — TELEPHONE ENCOUNTER
Patients daughter Jacob Campos calling. 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     Daughter stated it never came across on [employer]

## 2021-10-04 DIAGNOSIS — C19 COLORECTAL CANCER, STAGE IV (HCC): Primary | ICD-10-CM

## 2021-10-06 ENCOUNTER — TELEPHONE (OUTPATIENT)
Dept: HEMATOLOGY/ONCOLOGY | Facility: HOSPITAL | Age: 66
End: 2021-10-06

## 2021-10-06 NOTE — TELEPHONE ENCOUNTER
Severino Braswell, patient's daughter called to speak to the nurse for test results and to cancel a appointments. Please call [facility] when available.

## 2021-10-06 NOTE — TELEPHONE ENCOUNTER
Per Daughter patient is moving to [place]. He is scheduled for chemotherapy there on October 11th. They are cancelling remaining appointments. They are wondering if The BRAF results were back yet.       Notified per Brenda Alvarenga - the BRAF results are being

## 2021-10-08 ENCOUNTER — OFFICE VISIT (OUTPATIENT)
Dept: HEMATOLOGY/ONCOLOGY | Facility: HOSPITAL | Age: 66
End: 2021-10-08
Attending: SPECIALIST
Payer: COMMERCIAL

## 2021-10-08 DIAGNOSIS — C19 COLORECTAL CANCER, STAGE IV (HCC): Primary | ICD-10-CM

## 2021-10-08 PROCEDURE — 81210 BRAF GENE: CPT

## 2021-10-08 PROCEDURE — 88381 MICRODISSECTION MANUAL: CPT

## (undated) DEVICE — FORCEP BIOPSY RJ4 LG CAP W/ND

## (undated) NOTE — LETTER
Beatrice Singh 89  [address] [facility]  Authorization for Surgical Operation and Procedure     Date:___________                                                                                                         Time:__________ The following are some, but not all, of the potential risks that can occur: fever and allergic reactions, hemolytic reactions, transmission of diseases such as Hepatitis, AIDS and Cytomegalovirus (CMV) and fluid overload.   In the event that I wish to have behalf). The surgeon or my attending physician will determine when the applicable recovery period ends for purposes of reinstating the DNAR order.   10. Patients having a sterilization procedure: I understand that if the procedure is successful the results services, I agree to:  a. Allow the anesthesiologist (anesthesia doctor) to give me medicine and do additional procedures as necessary.  Some examples are: Starting or using an “IV” to give me medicine, fluids or blood during my procedure, and having a inocencio Regional Anesthesia (“spinal”, “epidural”, & “nerve blocks”): I understand that rare but potential complications include headache, bleeding, infection, seizure, irregular heart rhythms, and nerve injury.     I can change my mind about having anesthesia ser

## (undated) NOTE — LETTER
Beatrice Singh 89  [address] [facility]  Authorization for Surgical Operation and Procedure     Date:___________                                                                                                         Time:__________ blood products. The following are some, but not all, of the potential risks that can occur: fever and allergic reactions, hemolytic reactions, transmission of diseases such as Hepatitis, AIDS and Cytomegalovirus (CMV) and fluid overload.   In the event kimmy consent on my behalf). The surgeon or my attending physician will determine when the applicable recovery period ends for purposes of reinstating the DNAR order.   10. Patients having a sterilization procedure: I understand that if the procedure is successfu anesthesia services, I agree to:  a. Allow the anesthesiologist (anesthesia doctor) to give me medicine and do additional procedures as necessary.  Some examples are: Starting or using an “IV” to give me medicine, fluids or blood during my procedure, and ha injury. 7. Regional Anesthesia (“spinal”, “epidural”, & “nerve blocks”): I understand that rare but potential complications include headache, bleeding, infection, seizure, irregular heart rhythms, and nerve injury.     I can change my mind about having an

## (undated) NOTE — LETTER
Printed: 7/15/2021    Patient Name: Lina De Santiago  : 10/29/1955   Medical Record #: [id number]    Consent to Cancer Treatment    I, Lina De Santiago, understand that I have been diagnosed with metastatic rectal cancer.     I understand that the understand that complications from this treatment could even result in my death. Alternatives to this treatment have been explained to me and could include the option of no treatment. I also understand that I may stop this treatment at any time.     I